# Patient Record
Sex: FEMALE | Race: WHITE | NOT HISPANIC OR LATINO | Employment: FULL TIME | URBAN - METROPOLITAN AREA
[De-identification: names, ages, dates, MRNs, and addresses within clinical notes are randomized per-mention and may not be internally consistent; named-entity substitution may affect disease eponyms.]

---

## 2017-04-07 ENCOUNTER — OFFICE VISIT (OUTPATIENT)
Dept: OBSTETRICS AND GYNECOLOGY | Facility: CLINIC | Age: 52
End: 2017-04-07

## 2017-04-07 VITALS — WEIGHT: 168 LBS | BODY MASS INDEX: 30.91 KG/M2 | HEIGHT: 62 IN

## 2017-04-07 DIAGNOSIS — Z12.4 SCREENING FOR MALIGNANT NEOPLASM OF CERVIX: ICD-10-CM

## 2017-04-07 DIAGNOSIS — Z12.31 SCREENING MAMMOGRAM, ENCOUNTER FOR: ICD-10-CM

## 2017-04-07 DIAGNOSIS — Z13.9 SCREENING: Primary | ICD-10-CM

## 2017-04-07 PROBLEM — F41.9 ANXIETY: Status: ACTIVE | Noted: 2017-04-07

## 2017-04-07 PROBLEM — L90.0 LICHEN SCLEROSUS: Status: ACTIVE | Noted: 2017-04-07

## 2017-04-07 LAB
BILIRUB BLD-MCNC: NEGATIVE MG/DL
CLARITY, POC: CLEAR
COLOR UR: YELLOW
GLUCOSE UR STRIP-MCNC: NEGATIVE MG/DL
KETONES UR QL: NEGATIVE
LEUKOCYTE EST, POC: NEGATIVE
NITRITE UR-MCNC: NEGATIVE MG/ML
PH UR: 6.5 [PH] (ref 5–8)
PROT UR STRIP-MCNC: NEGATIVE MG/DL
RBC # UR STRIP: NEGATIVE /UL
SP GR UR: 1.03 (ref 1–1.03)
UROBILINOGEN UR QL: NORMAL

## 2017-04-07 PROCEDURE — 99396 PREV VISIT EST AGE 40-64: CPT | Performed by: NURSE PRACTITIONER

## 2017-04-07 PROCEDURE — 81002 URINALYSIS NONAUTO W/O SCOPE: CPT | Performed by: NURSE PRACTITIONER

## 2017-04-07 RX ORDER — CLINDAMYCIN PHOSPHATE 10 MG/ML
SOLUTION TOPICAL
COMMUNITY
Start: 2017-04-03 | End: 2017-06-30

## 2017-04-07 NOTE — PROGRESS NOTES
[facility], [facility]  Routine Annual Visit    2017    Patient: Tori Maya          MR#:[id number]      History of Present Illness    52 y.o. female  who presents for annual exam.Last pap negative 2015. Last mammogram - negative. Never had a colonscopy. Hx tubal ligation. No periods in 2 years. Denies vasomotor symptoms. Hx of lichen schlerosis-uses cream prn.    No LMP recorded. Patient is postmenopausal.  Obstetric History:  OB History      Para Term  AB TAB SAB Ectopic Multiple Living    0 0 0 0 0 0 0 0 0 0         Menstrual History:     No LMP recorded. Patient is postmenopausal.       Sexual History:       ________________________________________  There is no problem list on file for this patient.      Past Medical History:   Diagnosis Date   • Anxiety    • Colitis     childhood   • Epilepsy     age 1 none since    • Hypertension        • Lichen sclerosus    • Skin disease     gaanular skin ds over joints        Past Surgical History:   Procedure Laterality Date   • ANKLE SURGERY     • TUBAL ABDOMINAL LIGATION         History   Smoking Status   • Never Smoker   Smokeless Tobacco   • Never Used       Family History   Problem Relation Age of Onset   • Leukemia Other    • Stroke Father    • Crohn's disease Mother        Prior to Admission medications    Medication Sig Start Date End Date Taking? Authorizing Provider   clindamycin (CLEOCIN T) 1 % swab  4/3/17   Historical Provider, MD     ________________________________________    Current contraception: post menopausal status  History of abnormal Pap smear: no  Family history of uterine or ovarian cancer: no  Family History of colon cancer/colon polyps: no  History of abnormal mammogram: no  History of abnormal lipids: no    The following portions of the patient's history were reviewed and updated as appropriate: allergies, current medications, past family history, past medical history, past social history,  "past surgical history and problem list.    Review of Systems   Constitutional: Negative.    HENT: Negative.    Eyes: Negative for visual disturbance.   Respiratory: Negative for cough, shortness of breath and wheezing.    Cardiovascular: Negative for chest pain, palpitations and leg swelling.   Gastrointestinal: Negative for abdominal distention, abdominal pain, blood in stool, constipation, diarrhea, nausea and vomiting.   Endocrine: Negative for cold intolerance and heat intolerance.   Genitourinary: Negative for difficulty urinating, dyspareunia, dysuria, frequency, genital sores, hematuria, menstrual problem, pelvic pain, urgency, vaginal bleeding, vaginal discharge and vaginal pain.   Musculoskeletal: Negative.    Skin: Negative.    Neurological: Negative for dizziness, weakness, light-headedness, numbness and headaches.   Hematological: Negative.    Psychiatric/Behavioral: Negative.    Breasts: denies breast lumps, skin changes, swelling, tenderness, nipple discharge/changes bilaterally        Objective   Physical Exam    Ht 62\" (157.5 cm)  Wt 168 lb (76.2 kg)  BMI 30.73 kg/m2   BP Readings from Last 3 Encounters:   No data found for BP      Wt Readings from Last 3 Encounters:   04/07/17 168 lb (76.2 kg)        BMI: Estimated body mass index is 30.73 kg/(m^2) as calculated from the following:    Height as of this encounter: 62\" (157.5 cm).    Weight as of this encounter: 168 lb (76.2 kg).         General:   alert, appears stated age and cooperative   Heart: regular rate and rhythm, S1, S2 normal, no murmur, click, rub or gallop   Lungs: clear to auscultation bilaterally   Abdomen: soft, non-tender, without masses or organomegaly   Breast: inspection negative, no nipple discharge or bleeding, no masses or nodularity palpable   Vulva: Mild atrophic changes labia minora and introitus consistent with lichen schlerosis    Vagina: normal mucosa   Cervix: no cervical motion tenderness and no lesions   Uterus: " normal size or normal shape and consistency   Adnexa: normal adnexa and no mass, fullness, tenderness     Assessment:    1. Normal annual exam   2. Lichen schlerosis- pt reports using steriod cream only occasionally. Counseled on importance of adherence to this med and proper use.   3. Due for screening mammogram- arranged today    Plan:    []  Rx:   [x]  Mammogram request made  [x]  PAP done  []  Occult fecal blood test ([facility])  []  Labs:   []  GC/Chl/TV  []  DEXA scan   []  Referral for colonoscopy:     Pt has refused BP check the last several years. Highly encouraged monitoring blood pressure and checking today but she again refuses. Reports it has been a while since she has seen Dr. Hudson-recommend she have full physical and labs with him in near future. Also highly recommended a colonoscopy or referral to GI for colon cancer screening. Tori declines today but states she will consider. Also refuses fit kit today.    Ladan Garcia, APRN  4/7/2017 9:29 AM

## 2017-04-12 ENCOUNTER — HOSPITAL ENCOUNTER (OUTPATIENT)
Dept: MAMMOGRAPHY | Facility: HOSPITAL | Age: 52
Discharge: HOME | End: 2017-04-12
Admitting: NURSE PRACTITIONER

## 2017-04-12 DIAGNOSIS — Z12.31 SCREENING MAMMOGRAM, ENCOUNTER FOR: ICD-10-CM

## 2017-04-12 PROCEDURE — 77063 BREAST TOMOSYNTHESIS BI: CPT

## 2017-04-12 PROCEDURE — G0202 SCR MAMMO BI INCL CAD: HCPCS

## 2017-04-13 ENCOUNTER — TELEPHONE (OUTPATIENT)
Dept: OBSTETRICS AND GYNECOLOGY | Facility: CLINIC | Age: 52
End: 2017-04-13

## 2017-04-13 LAB
CYTOLOGIST CVX/VAG CYTO: NORMAL
CYTOLOGY CVX/VAG DOC THIN PREP: NORMAL
DX ICD CODE: NORMAL
HIV 1 & 2 AB SER-IMP: NORMAL
HPV I/H RISK 4 DNA CVX QL PROBE+SIG AMP: NEGATIVE
Lab: NORMAL
OTHER STN SPEC: NORMAL
PATH REPORT.FINAL DX SPEC: NORMAL
STAT OF ADQ CVX/VAG CYTO-IMP: NORMAL

## 2017-04-13 NOTE — TELEPHONE ENCOUNTER
----- Message from NITHIN Oneill sent at 4/13/2017  2:14 PM EDT -----  Please let pt know her pap smear and mammogram were totally normal. Thanks!

## 2017-06-30 ENCOUNTER — OFFICE VISIT (OUTPATIENT)
Dept: OBSTETRICS AND GYNECOLOGY | Facility: CLINIC | Age: 52
End: 2017-06-30

## 2017-06-30 VITALS — HEIGHT: 62 IN | WEIGHT: 168 LBS | BODY MASS INDEX: 30.91 KG/M2

## 2017-06-30 DIAGNOSIS — L90.0 LICHEN SCLEROSUS: Primary | ICD-10-CM

## 2017-06-30 DIAGNOSIS — T14.90XA TRAUMA: ICD-10-CM

## 2017-06-30 DIAGNOSIS — T14.8XXA BLOOD BLISTER: ICD-10-CM

## 2017-06-30 PROCEDURE — 99203 OFFICE O/P NEW LOW 30 MIN: CPT | Performed by: OBSTETRICS & GYNECOLOGY
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
## 2017-11-01 ENCOUNTER — TELEPHONE (OUTPATIENT)
Dept: OBSTETRICS AND GYNECOLOGY | Facility: CLINIC | Age: 52
End: 2017-11-01

## 2017-11-01 NOTE — TELEPHONE ENCOUNTER
Patient called requesting refill for Valerate Cream .2% Patient stated she saw Ladan in April and Ladan advised her to let her know when she needed a refill; pharmacy states since you didn't write her last script that we need to send to them this script with the new prescribing provider. Please advise.

## 2017-11-02 NOTE — TELEPHONE ENCOUNTER
Patient request her valerate cream refilled.  Seen Ladan 4/7/17 and was told she would fill when needed. Pap was negative.

## 2017-11-03 ENCOUNTER — TELEPHONE (OUTPATIENT)
Dept: OBSTETRICS AND GYNECOLOGY | Facility: CLINIC | Age: 52
End: 2017-11-03

## 2017-11-03 RX ORDER — HYDROCORTISONE VALERATE 2 MG/G
OINTMENT TOPICAL DAILY
Qty: 15 G | Refills: 3 | Status: SHIPPED | OUTPATIENT
Start: 2017-11-03 | End: 2018-05-18

## 2017-11-03 NOTE — TELEPHONE ENCOUNTER
PT CALLED AGAIN REGARDING REFILL ON HER VALERATE CREAM AGAIN. STATES YOU SAID IF SHE NEEDED REFILLS YOU WOULD FILL.   PLEASE ADVISE  ANALI

## 2018-05-18 ENCOUNTER — OFFICE VISIT (OUTPATIENT)
Dept: OBSTETRICS AND GYNECOLOGY | Facility: CLINIC | Age: 53
End: 2018-05-18

## 2018-05-18 VITALS — HEIGHT: 62 IN | WEIGHT: 172 LBS | BODY MASS INDEX: 31.65 KG/M2

## 2018-05-18 DIAGNOSIS — L90.0 LICHEN SCLEROSUS: ICD-10-CM

## 2018-05-18 DIAGNOSIS — Z01.419 ENCOUNTER FOR GYNECOLOGICAL EXAMINATION WITHOUT ABNORMAL FINDING: Primary | ICD-10-CM

## 2018-05-18 DIAGNOSIS — Z13.9 SCREENING FOR CONDITION: ICD-10-CM

## 2018-05-18 LAB
BILIRUB BLD-MCNC: NEGATIVE MG/DL
CLARITY, POC: CLEAR
COLOR UR: YELLOW
GLUCOSE UR STRIP-MCNC: NEGATIVE MG/DL
KETONES UR QL: NEGATIVE
LEUKOCYTE EST, POC: NEGATIVE
NITRITE UR-MCNC: NEGATIVE MG/ML
PH UR: 5 [PH] (ref 5–8)
PROT UR STRIP-MCNC: NEGATIVE MG/DL
RBC # UR STRIP: NEGATIVE /UL
SP GR UR: 1.03 (ref 1–1.03)
UROBILINOGEN UR QL: NORMAL

## 2018-05-18 PROCEDURE — 81002 URINALYSIS NONAUTO W/O SCOPE: CPT | Performed by: OBSTETRICS & GYNECOLOGY

## 2018-05-18 PROCEDURE — 99396 PREV VISIT EST AGE 40-64: CPT | Performed by: OBSTETRICS & GYNECOLOGY

## 2018-05-18 PROCEDURE — 99212 OFFICE O/P EST SF 10 MIN: CPT | Performed by: OBSTETRICS & GYNECOLOGY

## 2018-05-18 RX ORDER — FLUTICASONE PROPIONATE 0.05 %
CREAM (GRAM) TOPICAL DAILY
Qty: 60 G | Refills: 1 | Status: SHIPPED | OUTPATIENT
Start: 2018-05-18 | End: 2020-06-11

## 2018-05-18 NOTE — PROGRESS NOTES
GYN Annual Exam     CC- Here for annual exam.     Tori Maya is a 53 y.o. female who presents for annual well woman exam. Pt is a new pt to me. Menopause 2-3 years ago. No vasomotor symptoms. No HRT. Hx of OCP use. Pt has lichen sclerosis and is having an exacerbation. She has run out of her steroid cream for treatment    OB History      Para Term  AB Living    0 0 0 0 0 0    SAB TAB Ectopic Molar Multiple Live Births    0 0 0   0            Current contraception: post menopausal status  History of abnormal Pap smear: yes - remote at age 17  History of abnormal mammogram: no  Family history of uterine, colon or ovarian cancer: no  Family history of breast cancer: no    Health Maintenance   Topic Date Due   • ANNUAL PHYSICAL  1967   • TDAP/TD VACCINES (1 - Tdap) 1983   • ZOSTER VACCINE (1 of 2) 2014   • HEPATITIS C SCREENING  2017   • COLONOSCOPY  2017   • INFLUENZA VACCINE  2018   • PAP SMEAR  10/21/2018   • MAMMOGRAM  2019       Past Medical History:   Diagnosis Date   • Anxiety    • Colitis     childhood   • Epilepsy     age 1 none since    • Hypertension        • Lichen sclerosus    • Lichen sclerosus    • Skin disease     gaanular skin ds over joints        Past Surgical History:   Procedure Laterality Date   • ANKLE SURGERY     • TUBAL ABDOMINAL LIGATION           Current Outpatient Prescriptions:   •  fluticasone (CUTIVATE) 0.05 % cream, Apply  topically Daily., Disp: 60 g, Rfl: 1    Allergies   Allergen Reactions   • Penicillins Other (See Comments)     Childhood rx       Social History   Substance Use Topics   • Smoking status: Never Smoker   • Smokeless tobacco: Never Used   • Alcohol use Yes      Comment: occ       Family History   Problem Relation Age of Onset   • Leukemia Other    • Stroke Father    • Crohn's disease Mother        Review of Systems   Gastrointestinal: Negative for abdominal distention and abdominal pain.  "  Genitourinary: Negative for dyspareunia, menstrual problem, pelvic pain, vaginal bleeding, vaginal discharge and vaginal pain.       Ht 157.5 cm (62\")   Wt 78 kg (172 lb)   BMI 31.46 kg/m²     Physical Exam   Constitutional: She is oriented to person, place, and time. She appears well-developed and well-nourished.   HENT:   Mouth/Throat: Normal dentition. No dental caries.   Cardiovascular: Normal rate and regular rhythm.    Pulmonary/Chest: Effort normal and breath sounds normal. Right breast exhibits no inverted nipple, no mass, no nipple discharge, no skin change and no tenderness. Left breast exhibits no inverted nipple, no mass, no nipple discharge, no skin change and no tenderness.   Abdominal: Soft. She exhibits no distension and no mass. There is no tenderness.   Genitourinary:       There is no rash, tenderness or lesion on the right labia. There is no rash, tenderness or lesion on the left labia. Uterus is not deviated, not enlarged, not fixed and not tender. Cervix exhibits no motion tenderness, no discharge and no friability. Right adnexum displays no mass, no tenderness and no fullness. Left adnexum displays no mass, no tenderness and no fullness. No tenderness or bleeding in the vagina. No vaginal discharge found.   Genitourinary Comments: Lichen sclerosis   Neurological: She is alert and oriented to person, place, and time.   Psychiatric: She has a normal mood and affect. Her behavior is normal. Judgment and thought content normal.   Vitals reviewed.         Assessment/Plan    1) GYN HM: normal pap with neg HR HPV. MMG ordered. Never had a colonoscopy- discuussed proceeding with screening test.   SBE demonstrated and encouraged.  2) Lichen sclerosis: Rx Fluticasone cream to use 1-2 wks qhs.  3) Bone health - Weight bearing exercise, dietary calcium recommendations and vitamin D reviewed.   4) Diet and Exercise discussed  5) Smoking Status: nonsmoker  6) Social: Pt with very high anxiety about " being at physician. Refuses Blood pressure.  7) Follow up prn and 1 year       Diagnoses and all orders for this visit:    Encounter for gynecological examination without abnormal finding  -     Mammo Screening Bilateral With CAD; Future    Screening for condition  -     POC Urinalysis Dipstick    Lichen sclerosus    Other orders  -     fluticasone (CUTIVATE) 0.05 % cream; Apply  topically Daily.        Marli Briones DO  5/18/2018  9:46 AM

## 2018-05-29 ENCOUNTER — HOSPITAL ENCOUNTER (OUTPATIENT)
Dept: MAMMOGRAPHY | Facility: HOSPITAL | Age: 53
Discharge: HOME | End: 2018-05-29
Attending: OBSTETRICS & GYNECOLOGY | Admitting: OBSTETRICS & GYNECOLOGY

## 2018-05-29 DIAGNOSIS — Z01.419 ENCOUNTER FOR GYNECOLOGICAL EXAMINATION WITHOUT ABNORMAL FINDING: ICD-10-CM

## 2018-05-29 PROCEDURE — 77067 SCR MAMMO BI INCL CAD: CPT

## 2019-05-21 ENCOUNTER — OFFICE VISIT (OUTPATIENT)
Dept: OBSTETRICS AND GYNECOLOGY | Facility: CLINIC | Age: 54
End: 2019-05-21

## 2019-05-21 VITALS — WEIGHT: 159 LBS | BODY MASS INDEX: 29.26 KG/M2 | HEIGHT: 62 IN

## 2019-05-21 DIAGNOSIS — K64.9 ACUTE HEMORRHOID: ICD-10-CM

## 2019-05-21 DIAGNOSIS — Z01.419 WELL FEMALE EXAM WITH ROUTINE GYNECOLOGICAL EXAM: Primary | ICD-10-CM

## 2019-05-21 DIAGNOSIS — Z11.51 ENCOUNTER FOR SCREENING FOR HUMAN PAPILLOMAVIRUS (HPV): ICD-10-CM

## 2019-05-21 DIAGNOSIS — L90.0 LICHEN SCLEROSUS: ICD-10-CM

## 2019-05-21 DIAGNOSIS — F41.9 ANXIETY: ICD-10-CM

## 2019-05-21 LAB
BILIRUB BLD-MCNC: NEGATIVE MG/DL
CLARITY, POC: CLEAR
COLOR UR: YELLOW
GLUCOSE UR STRIP-MCNC: NEGATIVE MG/DL
KETONES UR QL: NEGATIVE
LEUKOCYTE EST, POC: NEGATIVE
NITRITE UR-MCNC: NEGATIVE MG/ML
PH UR: 6.5 [PH] (ref 5–8)
PROT UR STRIP-MCNC: NEGATIVE MG/DL
RBC # UR STRIP: NEGATIVE /UL
SP GR UR: 1.02 (ref 1–1.03)
UROBILINOGEN UR QL: NORMAL

## 2019-05-21 PROCEDURE — 99396 PREV VISIT EST AGE 40-64: CPT | Performed by: NURSE PRACTITIONER

## 2019-05-21 PROCEDURE — 81002 URINALYSIS NONAUTO W/O SCOPE: CPT | Performed by: NURSE PRACTITIONER

## 2019-05-23 ENCOUNTER — TELEPHONE (OUTPATIENT)
Dept: OBSTETRICS AND GYNECOLOGY | Facility: CLINIC | Age: 54
End: 2019-05-23

## 2019-05-23 LAB
CYTOLOGIST CVX/VAG CYTO: NORMAL
CYTOLOGY CVX/VAG DOC CYTO: NORMAL
CYTOLOGY CVX/VAG DOC THIN PREP: NORMAL
DX ICD CODE: NORMAL
HIV 1 & 2 AB SER-IMP: NORMAL
HPV I/H RISK 1 DNA CVX QL PROBE+SIG AMP: NEGATIVE
OTHER STN SPEC: NORMAL
STAT OF ADQ CVX/VAG CYTO-IMP: NORMAL

## 2019-05-23 RX ORDER — CLOBETASOL PROPIONATE 0.5 MG/G
OINTMENT TOPICAL 2 TIMES DAILY
Qty: 60 G | Refills: 1 | Status: SHIPPED | OUTPATIENT
Start: 2019-05-23 | End: 2020-06-11

## 2019-05-23 NOTE — TELEPHONE ENCOUNTER
Patient calling to see if you had found out about the replacement for her medication. She would like to talk to you.

## 2019-05-23 NOTE — TELEPHONE ENCOUNTER
Please let her know I finally got the clobetasol to go without the computer system questioning it.  If it is very expensive, we can send in an alternative.

## 2019-05-30 ENCOUNTER — OFFICE VISIT (OUTPATIENT)
Dept: OBSTETRICS AND GYNECOLOGY | Facility: CLINIC | Age: 54
End: 2019-05-30

## 2019-05-30 DIAGNOSIS — D69.2 PURPURA (HCC): Primary | ICD-10-CM

## 2019-05-30 PROCEDURE — 99212 OFFICE O/P EST SF 10 MIN: CPT | Performed by: NURSE PRACTITIONER

## 2019-06-06 PROBLEM — D69.2 PURPURA (HCC): Status: ACTIVE | Noted: 2019-06-06

## 2019-06-06 NOTE — PROGRESS NOTES
Subjective     Chief Complaint   Patient presents with   • Vaginal Pain       Tori Maya is a 54 y.o.  whose LMP is No LMP recorded. Patient is postmenopausal.. She presents with complaints of a vaginal cut. She states that after her AE she decided to do a vulvar self exam. During the exam she noticed the cut. She is worried that she could have vaginal cancer and is uncertain of what she needs to do. She denies pain. She does report having some itching d/t lichen sclerosis. She does scratch at times. She also has clobetasol to apply to the affected area when needed. She states she has been obsessively looking at the area. States she has very high anxiety in regards. Her annual exam was just completed last week.     HPI    HPI    The following portions of the patient's history were reviewed and updated as appropriate:vital signs, allergies, current medications, past medical history, past social history, past surgical history and problem list      Review of Systems     Review of Systems   Constitutional: Negative.    Gastrointestinal: Negative.    Genitourinary: Negative.         Vaginal cut     Psychiatric/Behavioral: Negative.        Objective      There were no vitals taken for this visit.    Physical Exam    Physical Exam   Constitutional: She is oriented to person, place, and time. She appears well-developed and well-nourished.   Abdominal: Soft. Bowel sounds are normal. Hernia confirmed negative in the right inguinal area and confirmed negative in the left inguinal area.   Genitourinary: Rectum normal.       Pelvic exam was performed with patient supine. There is no rash, tenderness, lesion or injury on the right labia. There is no rash, tenderness, lesion or injury on the left labia. Uterus is not deviated, not enlarged, not fixed and not tender. Cervix exhibits no motion tenderness, no discharge and no friability. Right adnexum displays no mass, no tenderness and no fullness. Left adnexum displays no  "mass, no tenderness and no fullness. No erythema, tenderness or bleeding in the vagina. No foreign body in the vagina. No signs of injury around the vagina. No vaginal discharge found.   Lymphadenopathy:        Right: No inguinal adenopathy present.        Left: No inguinal adenopathy present.   Neurological: She is alert and oriented to person, place, and time.   Skin: Skin is warm and dry.   Psychiatric: She has a normal mood and affect. Her behavior is normal.   Vitals reviewed.      Lab Review   Labs: No data reviewed     Imaging   No data reviewed    Assessment  There are no diagnoses linked to this encounter.    Additional Assessment:   1) Vulvar lesion    Plan     1. Vulvar lesion- Appears to be purpura of the vulva. Pt reassured. Advised pt that if area enlarges or becomes painful to notify the clinic. Instructed pt to \"leave the area alone.\" Enc use of clobetasol cream if itching occurs.    2. Pap:  5/19 NIL/HPV neg.     3. Smoking status: Non smoker.     4.  Annual Exam scheduled for: 5/20    RTO PRN.     Roma Sosa, NITHIN  5/30/19  "

## 2019-06-10 ENCOUNTER — HOSPITAL ENCOUNTER (OUTPATIENT)
Dept: MAMMOGRAPHY | Facility: HOSPITAL | Age: 54
Discharge: HOME | End: 2019-06-10
Admitting: NURSE PRACTITIONER

## 2019-06-10 DIAGNOSIS — Z01.419 WELL FEMALE EXAM WITH ROUTINE GYNECOLOGICAL EXAM: ICD-10-CM

## 2019-06-10 PROCEDURE — 77063 BREAST TOMOSYNTHESIS BI: CPT

## 2019-06-10 PROCEDURE — 77067 SCR MAMMO BI INCL CAD: CPT

## 2019-09-18 ENCOUNTER — TELEPHONE (OUTPATIENT)
Dept: OBSTETRICS AND GYNECOLOGY | Facility: CLINIC | Age: 54
End: 2019-09-18

## 2019-09-18 NOTE — TELEPHONE ENCOUNTER
Pt wants to know if there is an cheaper alternative to clobetasol because it cost her $75 from the pharmacy?

## 2019-09-19 NOTE — TELEPHONE ENCOUNTER
There really is not an alternative or anything cheaper that will give you the same benefit. Has she looked at [facility]?

## 2019-12-09 RX ORDER — HYDROCORTISONE ACETATE 25 MG/1
25 SUPPOSITORY RECTAL
Status: CANCELLED | OUTPATIENT
Start: 2019-12-09

## 2020-06-11 ENCOUNTER — OFFICE VISIT (OUTPATIENT)
Dept: OBSTETRICS AND GYNECOLOGY | Facility: CLINIC | Age: 55
End: 2020-06-11

## 2020-06-11 VITALS — BODY MASS INDEX: 29.63 KG/M2 | WEIGHT: 161 LBS | HEIGHT: 62 IN

## 2020-06-11 DIAGNOSIS — Z78.0 POSTMENOPAUSAL: ICD-10-CM

## 2020-06-11 DIAGNOSIS — N95.2 VAGINAL ATROPHY: ICD-10-CM

## 2020-06-11 DIAGNOSIS — Z01.419 PAP SMEAR, LOW-RISK: ICD-10-CM

## 2020-06-11 DIAGNOSIS — Z01.419 WELL WOMAN EXAM WITH ROUTINE GYNECOLOGICAL EXAM: Primary | ICD-10-CM

## 2020-06-11 DIAGNOSIS — L90.0 LICHEN SCLEROSUS: ICD-10-CM

## 2020-06-11 DIAGNOSIS — Z11.51 SPECIAL SCREENING EXAMINATION FOR HUMAN PAPILLOMAVIRUS (HPV): ICD-10-CM

## 2020-06-11 DIAGNOSIS — Z13.9 SCREENING FOR CONDITION: ICD-10-CM

## 2020-06-11 PROCEDURE — 99396 PREV VISIT EST AGE 40-64: CPT | Performed by: NURSE PRACTITIONER

## 2020-06-11 RX ORDER — HYDROCORTISONE ACETATE 25 MG/1
25 SUPPOSITORY RECTAL 2 TIMES DAILY
Qty: 20 EACH | Refills: 3 | Status: SHIPPED | OUTPATIENT
Start: 2020-06-11 | End: 2022-07-20

## 2020-06-22 PROBLEM — Z78.0 POSTMENOPAUSAL: Status: ACTIVE | Noted: 2020-06-22

## 2020-06-22 PROBLEM — Z01.419 WELL WOMAN EXAM WITH ROUTINE GYNECOLOGICAL EXAM: Status: ACTIVE | Noted: 2020-06-22

## 2020-06-22 PROBLEM — N95.2 VAGINAL ATROPHY: Status: ACTIVE | Noted: 2020-06-22

## 2020-06-22 NOTE — PROGRESS NOTES
GYN Annual Exam     Chief Complaint   Patient presents with   • Gynecologic Exam       Tori Maya is a 55 y.o. female who presents for annual well woman exam.She is an established patient. Her periods are absent since 2016. She denies menopausal symptoms.  No intermenstrual bleeding, spotting, or discharge. Mammogram neg, . She has a history of lichen sclerosis, she uses Clobetasol as needed, reports it is well controlled. She states she still has clobetasol at home if needed. She has not used the medication in a while. She has not had a colonoscopy and states she knows she needs one but has such high anxiety that she does not know if she can go through with it. She has considered [facility]. She reports she is very anxious about today's visit.  She does not do her SBE regularly. She is sexually active with her .     OB History        0    Para   0    Term   0       0    AB   0    Living   0       SAB   0    TAB   0    Ectopic   0    Molar        Multiple   0    Live Births                    Mammogram:   Dexa scan: Has not had  Colonoscopy: Has not had  Last Pap :   History of abnormal Pap smear: no  Family history of uterine, colon or ovarian cancer: no  Family history of breast cancer: no  History of abnormal mammogram: no        Past Medical History:   Diagnosis Date   • Anxiety    • Colitis     childhood   • Epilepsy (CMS/HCC)     age 1 none since    • Hypertension        • Lichen sclerosus    • Lichen sclerosus    • Skin disease     gaanular skin ds over joints        Past Surgical History:   Procedure Laterality Date   • ANKLE SURGERY     • TUBAL ABDOMINAL LIGATION           Current Outpatient Medications:   •  hydrocortisone (ANUSOL-HC) 25 MG suppository, Insert 1 suppository into the rectum 2 (Two) Times a Day., Disp: 20 each, Rfl: 3    Allergies   Allergen Reactions   • Penicillins Other (See Comments)     Childhood rx       Social History     Tobacco Use   •  "Smoking status: Never Smoker   • Smokeless tobacco: Never Used   Substance Use Topics   • Alcohol use: Yes     Comment: occ   • Drug use: No       Family History   Problem Relation Age of Onset   • Leukemia Other    • Stroke Father    • Crohn's disease Mother    • Breast cancer Neg Hx    • Ovarian cancer Neg Hx    • Uterine cancer Neg Hx    • Colon cancer Neg Hx        Review of Systems   Constitutional: Negative.    Respiratory: Negative.    Cardiovascular: Negative.    Gastrointestinal: Negative.    Endocrine: Negative.    Genitourinary: Negative.    Musculoskeletal: Negative.    Skin: Negative.    Neurological: Negative.    Psychiatric/Behavioral: Negative.        Ht 157.5 cm (62\")   Wt 73 kg (161 lb)   BMI 29.45 kg/m²     Physical Exam   Constitutional: She is oriented to person, place, and time. She appears well-developed and well-nourished.   Neck: Normal range of motion. Neck supple. No thyromegaly present.   Cardiovascular: Normal rate and regular rhythm.   Pulmonary/Chest: Effort normal and breath sounds normal. Right breast exhibits no inverted nipple, no mass, no nipple discharge, no skin change and no tenderness. Left breast exhibits no inverted nipple, no mass, no nipple discharge, no skin change and no tenderness.   Abdominal: Soft. Bowel sounds are normal.   Genitourinary: Rectum normal. Pelvic exam was performed with patient supine. There is no rash, tenderness, lesion or injury on the right labia. There is no rash, tenderness, lesion or injury on the left labia. Uterus is not deviated, not enlarged, not fixed and not tender. Cervix exhibits no motion tenderness, no discharge and no friability. Right adnexum displays no mass, no tenderness and no fullness. Left adnexum displays no mass, no tenderness and no fullness. No erythema, tenderness or bleeding in the vagina. No foreign body in the vagina. No signs of injury around the vagina. No vaginal discharge found.   Genitourinary Comments: Vaginal " atrophy noted.    Musculoskeletal: Normal range of motion. She exhibits no edema.   Neurological: She is alert and oriented to person, place, and time.   Skin: Skin is warm and dry.   Psychiatric: She has a normal mood and affect. Her behavior is normal.   Vitals reviewed.         Assessment     1) GYN annual well woman exam.   2) Postmenopausal   3) Vaginal atrophy   4) Lichen sclerosis      Plan     1) Breast Health - Clinical breast exam & mammogram yearly, Self breast awareness. Schedule screening mammogram.   2) Pap - Collected today at pt's request.  Pap/HPV neg 5/19.   3) STD- Declines   4 Smoking status- non smoker  5) Colon health - screening colonoscopy recommended if not up to date. Disc [facility], pt declines at this time. Enc screening.   6) Patient's Body mass index is 29.45 kg/m². BMI is above normal parameters. Recommendations include: exercise counseling and nutrition counseling.  7) Bone health - Weight bearing exercise, dietary calcium recommendations and vitamin D  reviewed.   8) Vaginal atrophy- Samples of Imvexy 4 mg provided for trial. Pt to call office if she desires a rx.   9) Lichen sclerosis- Has Clobetasol cream at home if needed.   10) Follow up prn and one year    Roma Sosa, NITHIN  6/11/20

## 2020-08-04 ENCOUNTER — HOSPITAL ENCOUNTER (OUTPATIENT)
Dept: MAMMOGRAPHY | Facility: HOSPITAL | Age: 55
Discharge: HOME | End: 2020-08-04
Admitting: NURSE PRACTITIONER

## 2020-08-04 DIAGNOSIS — Z01.419 WELL WOMAN EXAM WITH ROUTINE GYNECOLOGICAL EXAM: ICD-10-CM

## 2020-08-04 PROCEDURE — 77067 SCR MAMMO BI INCL CAD: CPT

## 2020-08-04 PROCEDURE — 77063 BREAST TOMOSYNTHESIS BI: CPT

## 2021-03-23 RX ORDER — FLUTICASONE PROPIONATE 0.05 %
CREAM (GRAM) TOPICAL DAILY
Qty: 60 EACH | Refills: 0 | Status: SHIPPED | OUTPATIENT
Start: 2021-03-23 | End: 2022-02-15

## 2021-10-19 ENCOUNTER — OFFICE VISIT (OUTPATIENT)
Dept: OBSTETRICS AND GYNECOLOGY | Facility: CLINIC | Age: 56
End: 2021-10-19

## 2021-10-19 VITALS — WEIGHT: 170.4 LBS | BODY MASS INDEX: 31.36 KG/M2 | HEIGHT: 62 IN

## 2021-10-19 DIAGNOSIS — Z11.51 SPECIAL SCREENING EXAMINATION FOR HUMAN PAPILLOMAVIRUS (HPV): ICD-10-CM

## 2021-10-19 DIAGNOSIS — N95.2 VAGINAL ATROPHY: ICD-10-CM

## 2021-10-19 DIAGNOSIS — Z78.0 POSTMENOPAUSAL: ICD-10-CM

## 2021-10-19 DIAGNOSIS — N90.89 VULVAR LESION: ICD-10-CM

## 2021-10-19 DIAGNOSIS — Z01.419 PAP TEST, AS PART OF ROUTINE GYNECOLOGICAL EXAMINATION: ICD-10-CM

## 2021-10-19 DIAGNOSIS — Z01.419 PAP SMEAR, LOW-RISK: ICD-10-CM

## 2021-10-19 DIAGNOSIS — Z01.419 WELL WOMAN EXAM WITH ROUTINE GYNECOLOGICAL EXAM: Primary | ICD-10-CM

## 2021-10-19 LAB
BILIRUB BLD-MCNC: NEGATIVE MG/DL
CLARITY, POC: CLEAR
COLOR UR: YELLOW
GLUCOSE UR STRIP-MCNC: NEGATIVE MG/DL
KETONES UR QL: NEGATIVE
LEUKOCYTE EST, POC: NEGATIVE
NITRITE UR-MCNC: NEGATIVE MG/ML
PH UR: 6 [PH] (ref 5–8)
PROT UR STRIP-MCNC: NEGATIVE MG/DL
RBC # UR STRIP: NEGATIVE /UL
SP GR UR: 1.02 (ref 1–1.03)
UROBILINOGEN UR QL: NORMAL

## 2021-10-19 PROCEDURE — 99396 PREV VISIT EST AGE 40-64: CPT | Performed by: NURSE PRACTITIONER

## 2021-10-19 PROCEDURE — 81002 URINALYSIS NONAUTO W/O SCOPE: CPT | Performed by: NURSE PRACTITIONER

## 2021-10-19 NOTE — PROGRESS NOTES
GYN Annual Exam     Chief Complaint   Patient presents with   • Gynecologic Exam       Tori Maya is a 56 y.o. female who presents for annual well woman exam.She is an established patient. Her periods are absent since 2016. She denies menopausal symptoms.  No intermenstrual bleeding, spotting, or discharge. Mammogram neg, .  She has a history of lichen sclerosis, she uses Clobetasol as needed, reports it is well controlled. She states she still has clobetasol at home if needed. She has not used the medication in a while.     She has not had a colonoscopy and states she knows she needs one but has such high anxiety that she does not know if she can go through with it. She has considered [place] [facility].     She does her SBE regularly. She is sexually active with her . She does c/o decreased libido    She is extremely nervous regarding her exam.     OB History        0    Para   0    Term   0       0    AB   0    Living   0       SAB   0    IAB   0    Ectopic   0    Molar        Multiple   0    Live Births                    Mammogram:  Dexa scan: Has not had  Colonoscopy: Has not had  Last Pap :  NIL/HPV neg   History of abnormal Pap smear: no  Family history of uterine, colon or ovarian cancer: no  Family history of breast cancer: no  History of abnormal mammogram: no        Past Medical History:   Diagnosis Date   • Anxiety    • Colitis     childhood   • Epilepsy (HCC)     age 1 none since    • Hypertension        • Lichen sclerosus    • Lichen sclerosus    • Skin disease     gaanular skin ds over joints        Past Surgical History:   Procedure Laterality Date   • ANKLE SURGERY     • TUBAL ABDOMINAL LIGATION           Current Outpatient Medications:   •  fluticasone (CUTIVATE) 0.05 % cream, APPLY TOPICALLY DAILY, Disp: 60 each, Rfl: 0  •  hydrocortisone (ANUSOL-HC) 25 MG suppository, Insert 1 suppository into the rectum 2 (Two) Times a Day., Disp: 20 each, Rfl:  "3    Allergies   Allergen Reactions   • Penicillins Other (See Comments)     Childhood rx       Social History     Tobacco Use   • Smoking status: Never Smoker   • Smokeless tobacco: Never Used   Substance Use Topics   • Alcohol use: Yes     Comment: occ   • Drug use: No       Family History   Problem Relation Age of Onset   • Leukemia Other    • Stroke Father    • Crohn's disease Mother    • Breast cancer Neg Hx    • Ovarian cancer Neg Hx    • Uterine cancer Neg Hx    • Colon cancer Neg Hx        Review of Systems   Constitutional: Negative.    Respiratory: Negative.    Cardiovascular: Negative.    Gastrointestinal: Negative.    Endocrine: Negative.    Genitourinary: Negative.         Decreased sex drive    Musculoskeletal: Negative.    Skin: Negative.    Neurological: Negative.    Psychiatric/Behavioral: Negative.        Ht 157.5 cm (62\")   Wt 77.3 kg (170 lb 6.4 oz)   Breastfeeding No   BMI 31.17 kg/m²     Physical Exam   Constitutional: She is oriented to person, place, and time. She appears well-developed.   Neck: No thyromegaly present.   Cardiovascular: Normal rate, regular rhythm and normal heart sounds.   Pulmonary/Chest: Effort normal and breath sounds normal. Right breast exhibits no inverted nipple, no mass, no nipple discharge, no skin change and no tenderness. Left breast exhibits no inverted nipple, no mass, no nipple discharge, no skin change and no tenderness.   Abdominal: Soft. Bowel sounds are normal.   Genitourinary:    Rectum normal.            Pelvic exam was performed with patient supine.   There is lesion on the right labia. There is no rash, tenderness or injury on the right labia. There is no rash, tenderness, lesion or injury on the left labia. Uterus is not deviated, not enlarged, not fixed and not tender. Cervix exhibits no motion tenderness, no discharge and no friability. Right adnexum displays no mass, no tenderness and no fullness. Left adnexum displays no mass, no tenderness and " "no fullness.    No vaginal discharge, erythema, tenderness or bleeding.   No erythema, tenderness or bleeding in the vagina.    No foreign body in the vagina.      No signs of injury in the vagina.      Genitourinary Comments: Vaginal atrophy noted.      Musculoskeletal: Normal range of motion.   Neurological: She is alert and oriented to person, place, and time.   Skin: Skin is warm and dry.   Psychiatric: Her behavior is normal. Mood normal.   Vitals reviewed.         Assessment     1) GYN annual well woman exam.   2) Postmenopausal   3) Vaginal atrophy   4) Lichen sclerosis   5) Vulvar lesion   6) Decreased libido     Plan     1) Breast Health - Clinical breast exam & mammogram yearly, Self breast awareness. Schedule screening mammogram.   2) Pap - Collected today at pt's request.  Pap/HPV neg 6/20.   3) STD- Declines   4 Smoking status- non smoker  5) Colon health - screening colonoscopy recommended if not up to date. Disc ColoGuard, enc pt to consider. Enc screening.   6) Patient's Body mass index is 31.17 kg/m². BMI is above normal parameters. Recommendations include: exercise counseling and nutrition counseling.  7) Bone health - Weight bearing exercise, dietary calcium recommendations and vitamin D  reviewed.   8) Vaginal atrophy- Denies c/o. Atrophy noted on exam.   9) Lichen sclerosis- Has Clobetasol cream at home if needed.   10) Vulvar lesion- Pt reports she \"scratched\" the area. She has looked at the area with a mirror. Rec Clobetasol BID and aquaphor for symptoms. If no improvement, needs vulvar bx in 2 weeks.   11) Decreased libido- Disc with patient. She declines ref to sex therapist. Disc medication option, pt declines.     RTO 2 weeks    Roma Sosa, APRN  6/11/20  "

## 2021-10-21 LAB
CYTOLOGIST CVX/VAG CYTO: NORMAL
CYTOLOGY CVX/VAG DOC CYTO: NORMAL
CYTOLOGY CVX/VAG DOC THIN PREP: NORMAL
DX ICD CODE: NORMAL
HIV 1 & 2 AB SER-IMP: NORMAL
HPV I/H RISK 4 DNA CVX QL PROBE+SIG AMP: NEGATIVE
OTHER STN SPEC: NORMAL
STAT OF ADQ CVX/VAG CYTO-IMP: NORMAL

## 2021-10-21 RX ORDER — TRIAMCINOLONE ACETONIDE 5 MG/G
1 OINTMENT TOPICAL 2 TIMES DAILY
Qty: 15 G | Refills: 0 | Status: SHIPPED | OUTPATIENT
Start: 2021-10-21 | End: 2022-10-26 | Stop reason: SDUPTHER

## 2021-12-02 ENCOUNTER — TRANSCRIBE ORDERS (OUTPATIENT)
Dept: ADMINISTRATIVE | Facility: HOSPITAL | Age: 56
End: 2021-12-02

## 2021-12-02 DIAGNOSIS — Z12.31 SCREENING MAMMOGRAM FOR BREAST CANCER: Primary | ICD-10-CM

## 2021-12-21 ENCOUNTER — HOSPITAL ENCOUNTER (OUTPATIENT)
Dept: MAMMOGRAPHY | Facility: HOSPITAL | Age: 56
Discharge: HOME | End: 2021-12-21
Admitting: NURSE PRACTITIONER

## 2021-12-21 DIAGNOSIS — Z12.31 SCREENING MAMMOGRAM FOR BREAST CANCER: ICD-10-CM

## 2021-12-21 PROCEDURE — 77063 BREAST TOMOSYNTHESIS BI: CPT

## 2021-12-21 PROCEDURE — 77067 SCR MAMMO BI INCL CAD: CPT

## 2022-02-15 ENCOUNTER — OFFICE VISIT (OUTPATIENT)
Dept: OBSTETRICS AND GYNECOLOGY | Facility: CLINIC | Age: 57
End: 2022-02-15

## 2022-02-15 VITALS — BODY MASS INDEX: 31.47 KG/M2 | HEIGHT: 62 IN | WEIGHT: 171 LBS

## 2022-02-15 DIAGNOSIS — N90.5 VULVAR ATROPHY: Primary | ICD-10-CM

## 2022-02-15 DIAGNOSIS — Z13.89 SCREENING FOR GENITOURINARY CONDITION: ICD-10-CM

## 2022-02-15 LAB
BILIRUB BLD-MCNC: NEGATIVE MG/DL
CLARITY, POC: CLEAR
COLOR UR: YELLOW
GLUCOSE UR STRIP-MCNC: NEGATIVE MG/DL
KETONES UR QL: NEGATIVE
LEUKOCYTE EST, POC: NEGATIVE
NITRITE UR-MCNC: NEGATIVE MG/ML
PH UR: 5 [PH] (ref 5–8)
PROT UR STRIP-MCNC: NEGATIVE MG/DL
RBC # UR STRIP: NEGATIVE /UL
SP GR UR: 1 (ref 1–1.03)
UROBILINOGEN UR QL: NORMAL

## 2022-02-15 PROCEDURE — 99213 OFFICE O/P EST LOW 20 MIN: CPT | Performed by: NURSE PRACTITIONER

## 2022-02-15 PROCEDURE — 81002 URINALYSIS NONAUTO W/O SCOPE: CPT | Performed by: NURSE PRACTITIONER

## 2022-02-15 NOTE — PROGRESS NOTES
"Subjective     Chief Complaint   Patient presents with   • Follow-up     lichen sclerosis       Tori Maya is a 57 y.o.  whose LMP is No LMP recorded. Patient is postmenopausal.. She presents with c/o a red spot in on the (L) labia that is worrisome to her. She has known lichen sclerosis. She has not been using her steroid cream because her symptoms are well controlled right now. She noticed the spot while doing a vulvar self exam.     HPI    HPI    The following portions of the patient's history were reviewed and updated as appropriate:vital signs, allergies, current medications, past medical history, past social history, past surgical history and problem list      Review of Systems     Review of Systems   Constitutional: Negative.    Gastrointestinal: Negative.    Genitourinary:        Red spot on the inside of (L) labia     Musculoskeletal: Negative.    Skin: Negative.    Psychiatric/Behavioral: Negative.        Objective      Ht 157.5 cm (62.01\")   Wt 77.6 kg (171 lb)   BMI 31.27 kg/m²     Physical Exam    Physical Exam  Vitals and nursing note reviewed.   Genitourinary:     Labia:         Right: Tenderness present. No rash, lesion or injury.         Left: Tenderness and lesion present. No rash or injury.           Comments: Atrophy noted. LIchen sclerosis present but appears improved from last exam.   Musculoskeletal:         General: Normal range of motion.   Skin:     General: Skin is warm and dry.   Neurological:      General: No focal deficit present.      Mental Status: She is oriented to person, place, and time.   Psychiatric:         Mood and Affect: Mood normal.         Lab Review   Labs: Urinalysis - with micro     Imaging   No data reviewed    Assessment  Diagnoses and all orders for this visit:    1. Screening for genitourinary condition (Primary)  -     POC Urinalysis Dipstick    Other orders  -     conjugated estrogens (PREMARIN) 0.625 MG/GM vaginal cream; Insert 1 applicator in to the " vagina twice weekly  Dispense: 30 g; Refill: 6        Additional Assessment:   1. Atrophy   2. Lichen sclerosis      Plan     1. Atrophy-  [place] in to view area as well. Agrees this is most likely atrophy. Rec vaginal estrogen cream. Pt is concerned regarding HRT. Disc topical vs systemic HRT. She agrees to trial the estrogen vaginal cream. ERX sent.   2. Lichen sclerosis- Disc difference between lichen sclerosis and atrophy. Continue Vulvar self exams.     RTO PRN     Roma Sosa, APRN  2/15/2022

## 2022-07-20 RX ORDER — HYDROCORTISONE ACETATE 25 MG
SUPPOSITORY, RECTAL RECTAL
Qty: 20 SUPPOSITORY | Refills: 0 | Status: SHIPPED | OUTPATIENT
Start: 2022-07-20

## 2022-10-26 ENCOUNTER — OFFICE VISIT (OUTPATIENT)
Dept: OBSTETRICS AND GYNECOLOGY | Facility: CLINIC | Age: 57
End: 2022-10-26

## 2022-10-26 VITALS — BODY MASS INDEX: 31.1 KG/M2 | WEIGHT: 169 LBS | HEIGHT: 62 IN

## 2022-10-26 DIAGNOSIS — Z01.419 ROUTINE GYNECOLOGICAL EXAMINATION: Primary | ICD-10-CM

## 2022-10-26 DIAGNOSIS — Z01.419 PAP SMEAR, LOW-RISK: ICD-10-CM

## 2022-10-26 DIAGNOSIS — N90.5 VULVAR ATROPHY: ICD-10-CM

## 2022-10-26 DIAGNOSIS — Z11.51 SPECIAL SCREENING EXAMINATION FOR HUMAN PAPILLOMAVIRUS (HPV): ICD-10-CM

## 2022-10-26 PROCEDURE — 99396 PREV VISIT EST AGE 40-64: CPT | Performed by: NURSE PRACTITIONER

## 2022-10-26 RX ORDER — TRIAMCINOLONE ACETONIDE 5 MG/G
1 OINTMENT TOPICAL 2 TIMES DAILY
Qty: 15 G | Refills: 0 | Status: SHIPPED | OUTPATIENT
Start: 2022-10-26

## 2022-11-09 PROBLEM — Z01.419 ROUTINE GYNECOLOGICAL EXAMINATION: Status: ACTIVE | Noted: 2022-11-09

## 2022-11-09 NOTE — PROGRESS NOTES
"GYN Annual Exam     Chief Complaint   Patient presents with   • Gynecologic Exam       Tori Maya is a 57 y.o. female who presents for annual well woman exam.She is an established patient. Her periods are absent since 2016. She denies menopausal symptoms.  No intermenstrual bleeding, spotting, or discharge. Mammogram neg, .    She has a history of lichen sclerosis, she uses Clobetasol as needed, reports it is well controlled. She states she still has clobetasol at home if needed. She has not used the medication in a while.  She has a RX for vaginal estrogen but does not use, she states, \"that medicine scares me.\"     She has not had a colonoscopy and states she knows she needs one but has such high anxiety that she does not know if she can go through with it. She has considered [place] [facility].     She does her SBE regularly. She is sexually active with her .     She is extremely nervous regarding her exam.     OB History        0    Para   0    Term   0       0    AB   0    Living   0       SAB   0    IAB   0    Ectopic   0    Molar        Multiple   0    Live Births                    Mammogram:  Dexa scan: Has not had  Colonoscopy: Has not had  Last Pap :  NIL/HPV neg   History of abnormal Pap smear: no  Family history of uterine, colon or ovarian cancer: no  Family history of breast cancer: no  History of abnormal mammogram: no        Past Medical History:   Diagnosis Date   • Anxiety    • Colitis     childhood   • Epilepsy (HCC)     age 1 none since    • Hypertension        • Lichen sclerosus    • Lichen sclerosus    • Skin disease     gaanular skin ds over joints        Past Surgical History:   Procedure Laterality Date   • ANKLE SURGERY     • TUBAL ABDOMINAL LIGATION           Current Outpatient Medications:   •  triamcinolone (KENALOG) 0.5 % ointment, Apply 1 application topically to the appropriate area as directed 2 (Two) Times a Day., Disp: 15 g, Rfl: 0  •  " "Anucort-HC 25 MG suppository, INSERT 1 SUPPOSITORY INTO THE RECTUM TWO TIMES A DAY, Disp: 20 suppository, Rfl: 0  •  Estrogens Conjugated (PREMARIN) 0.625 MG/GM vaginal cream, Insert  into the vagina 2 (Two) Times a Week. 1g per vagina, twice a week, Disp: 45 g, Rfl: 6    Allergies   Allergen Reactions   • Penicillins Other (See Comments)     Childhood rx       Social History     Tobacco Use   • Smoking status: Never   • Smokeless tobacco: Never   Substance Use Topics   • Alcohol use: Yes     Comment: occ   • Drug use: No       Family History   Problem Relation Age of Onset   • Leukemia Other    • Stroke Father    • Crohn's disease Mother    • Breast cancer Neg Hx    • Ovarian cancer Neg Hx    • Uterine cancer Neg Hx    • Colon cancer Neg Hx        Review of Systems   Constitutional: Negative.    Respiratory: Negative.    Cardiovascular: Negative.    Gastrointestinal: Negative.    Endocrine: Negative.    Genitourinary: Negative.    Musculoskeletal: Negative.    Skin: Negative.    Neurological: Negative.    Psychiatric/Behavioral: Negative.        Ht 157.5 cm (62\")   Wt 76.7 kg (169 lb)   BMI 30.91 kg/m²     Physical Exam  Vitals reviewed.   Constitutional:       Appearance: She is well-developed.   Neck:      Thyroid: No thyromegaly.   Cardiovascular:      Rate and Rhythm: Normal rate and regular rhythm.      Heart sounds: Normal heart sounds.   Pulmonary:      Effort: Pulmonary effort is normal.      Breath sounds: Normal breath sounds.   Chest:   Breasts:     Right: No inverted nipple, mass, nipple discharge, skin change or tenderness.      Left: No inverted nipple, mass, nipple discharge, skin change or tenderness.   Abdominal:      General: Bowel sounds are normal.      Palpations: Abdomen is soft.   Genitourinary:     Exam position: Supine.      Labia:         Right: No rash, tenderness, lesion or injury.         Left: No rash, tenderness, lesion or injury.       Vagina: No signs of injury and foreign body. " No vaginal discharge, erythema, tenderness or bleeding.      Cervix: No cervical motion tenderness, discharge or friability.      Uterus: Not deviated, not enlarged, not fixed and not tender.       Adnexa:         Right: No mass, tenderness or fullness.          Left: No mass, tenderness or fullness.        Rectum: Normal.   Musculoskeletal:         General: Normal range of motion.      Cervical back: Normal range of motion and neck supple.   Skin:     General: Skin is warm and dry.   Neurological:      General: No focal deficit present.      Mental Status: She is alert and oriented to person, place, and time.   Psychiatric:         Mood and Affect: Mood normal.         Behavior: Behavior normal.            Assessment     1) GYN annual well woman exam.   2) Postmenopausal   3) Lichen sclerosis   4) Vaginal atrophy      Plan     1) Breast Health - Clinical breast exam & mammogram yearly, Self breast awareness. Schedule screening mammogram.   2) Pap - Collected today.   3) Lichen sclerosis- Well managed currently. Has Clobetasol for use of needed. Continue to enc monthly self vulvar exams.   4) Vaginal atrophy- Noted on exam. Disc risk associated with use. ERX Vaginal estrogen, enc use.   5) STD- Declines  6) Smoking status- non smoker   7) Colon health - screening colonoscopy recommended if not up to date  8) Bone health - Weight bearing exercise, dietary calcium recommendations and vitamin D  reviewed.     Follow up prn and one year    NITHIN Cox  11/9/2022  10:03 EST

## 2022-11-10 ENCOUNTER — TELEPHONE (OUTPATIENT)
Dept: OBSTETRICS AND GYNECOLOGY | Facility: CLINIC | Age: 57
End: 2022-11-10

## 2022-12-15 ENCOUNTER — TRANSCRIBE ORDERS (OUTPATIENT)
Dept: ADMINISTRATIVE | Facility: HOSPITAL | Age: 57
End: 2022-12-15

## 2022-12-15 DIAGNOSIS — Z12.31 VISIT FOR SCREENING MAMMOGRAM: Primary | ICD-10-CM

## 2023-01-04 ENCOUNTER — HOSPITAL ENCOUNTER (OUTPATIENT)
Dept: MAMMOGRAPHY | Facility: HOSPITAL | Age: 58
Discharge: HOME | End: 2023-01-04
Admitting: NURSE PRACTITIONER
Payer: COMMERCIAL

## 2023-01-04 DIAGNOSIS — Z12.31 VISIT FOR SCREENING MAMMOGRAM: ICD-10-CM

## 2023-01-04 PROCEDURE — 77063 BREAST TOMOSYNTHESIS BI: CPT

## 2023-01-04 PROCEDURE — 77067 SCR MAMMO BI INCL CAD: CPT

## 2023-08-17 RX ORDER — TRIAMCINOLONE ACETONIDE 5 MG/G
OINTMENT TOPICAL
Qty: 15 G | Refills: 0 | Status: SHIPPED | OUTPATIENT
Start: 2023-08-17

## 2024-02-13 ENCOUNTER — TRANSCRIBE ORDERS (OUTPATIENT)
Dept: MAMMOGRAPHY | Facility: HOSPITAL | Age: 59
End: 2024-02-13

## 2024-02-13 DIAGNOSIS — Z12.31 BREAST CANCER SCREENING BY MAMMOGRAM: Primary | ICD-10-CM

## 2024-02-19 ENCOUNTER — OFFICE VISIT (OUTPATIENT)
Dept: OBSTETRICS AND GYNECOLOGY | Facility: CLINIC | Age: 59
End: 2024-02-19
Payer: COMMERCIAL

## 2024-02-19 VITALS — WEIGHT: 168 LBS | BODY MASS INDEX: 30.91 KG/M2 | HEIGHT: 62 IN

## 2024-02-19 DIAGNOSIS — Z01.419 PAP SMEAR, AS PART OF ROUTINE GYNECOLOGICAL EXAMINATION: Primary | ICD-10-CM

## 2024-02-19 DIAGNOSIS — Z01.419 ROUTINE GYNECOLOGICAL EXAMINATION: ICD-10-CM

## 2024-02-19 DIAGNOSIS — Z11.51 SPECIAL SCREENING EXAMINATION FOR HUMAN PAPILLOMAVIRUS (HPV): ICD-10-CM

## 2024-02-19 DIAGNOSIS — Z01.419 WELL WOMAN EXAM WITH ROUTINE GYNECOLOGICAL EXAM: ICD-10-CM

## 2024-02-19 DIAGNOSIS — R31.9 HEMATURIA, UNSPECIFIED TYPE: ICD-10-CM

## 2024-02-19 LAB
BILIRUB BLD-MCNC: NEGATIVE MG/DL
CLARITY, POC: CLEAR
COLOR UR: YELLOW
GLUCOSE UR STRIP-MCNC: NEGATIVE MG/DL
KETONES UR QL: NEGATIVE
LEUKOCYTE EST, POC: NEGATIVE
NITRITE UR-MCNC: NEGATIVE MG/ML
PH UR: 5 [PH] (ref 5–8)
PROT UR STRIP-MCNC: NEGATIVE MG/DL
RBC # UR STRIP: ABNORMAL /UL
SP GR UR: 1 (ref 1–1.03)
UROBILINOGEN UR QL: NORMAL

## 2024-02-19 PROCEDURE — 81002 URINALYSIS NONAUTO W/O SCOPE: CPT | Performed by: NURSE PRACTITIONER

## 2024-02-19 PROCEDURE — 99396 PREV VISIT EST AGE 40-64: CPT | Performed by: NURSE PRACTITIONER

## 2024-02-19 RX ORDER — TRIAMCINOLONE ACETONIDE 5 MG/G
OINTMENT TOPICAL 2 TIMES DAILY
Qty: 15 G | Refills: 1 | Status: SHIPPED | OUTPATIENT
Start: 2024-02-19

## 2024-02-19 RX ORDER — HYDROCORTISONE ACETATE 25 MG/1
SUPPOSITORY RECTAL
Qty: 20 SUPPOSITORY | Refills: 2 | Status: SHIPPED | OUTPATIENT
Start: 2024-02-19

## 2024-02-19 NOTE — PROGRESS NOTES
GYN Annual Exam     Chief Complaint   Patient presents with    Gynecologic Exam       Tori Maya is a 57 y.o. female who presents for annual well woman exam.She is an established patient. Her periods are absent since 2016. She denies menopausal symptoms.  No intermenstrual bleeding, spotting, or discharge.     She has a history of lichen sclerosis, she uses Clobetasol as needed, reports it is well controlled. She is not using estrogen cream. She uses coconut oil.     She does her SBE regularly. She is sexually active with her . She is asking for refills of the hemorrhoid suppositories.     Denies any changes in PMH or FmHx.       OB History          0    Para   0    Term   0       0    AB   0    Living   0         SAB   0    IAB   0    Ectopic   0    Molar        Multiple   0    Live Births                    Mammogram:  Dexa scan: Has not had  Colonoscopy: Has not had  Last Pap : 10/22 NIL/HPV neg   History of abnormal Pap smear: no  Family history of uterine, colon or ovarian cancer: no  Family history of breast cancer: no  History of abnormal mammogram: no        Past Medical History:   Diagnosis Date    Anxiety     Colitis     childhood    Epilepsy     age 1 none since     Hypertension     2013    Lichen sclerosus 2014    Lichen sclerosus     Skin disease     gaanular skin ds over joints        Past Surgical History:   Procedure Laterality Date    ANKLE SURGERY      TUBAL ABDOMINAL LIGATION           Current Outpatient Medications:     triamcinolone (KENALOG) 0.5 % ointment, APPLY TO AFFECTED AREA(S) TWO TIMES A DAY, Disp: 15 g, Rfl: 0    Anucort-HC 25 MG suppository, INSERT 1 SUPPOSITORY INTO THE RECTUM TWO TIMES A DAY (Patient not taking: Reported on 2024), Disp: 20 suppository, Rfl: 0    Estrogens Conjugated (PREMARIN) 0.625 MG/GM vaginal cream, Insert  into the vagina 2 (Two) Times a Week. 1g per vagina, twice a week (Patient not taking: Reported on 2024), Disp: 45 g,  "Rfl: 6    Allergies   Allergen Reactions    Penicillins Other (See Comments)     Childhood rx    Latex Rash       Social History     Tobacco Use    Smoking status: Never    Smokeless tobacco: Never   Substance Use Topics    Alcohol use: Yes     Comment: occ    Drug use: No       Family History   Problem Relation Age of Onset    Leukemia Other     Stroke Father     Crohn's disease Mother     Breast cancer Neg Hx     Ovarian cancer Neg Hx     Uterine cancer Neg Hx     Colon cancer Neg Hx        Review of Systems   Constitutional: Negative.    Respiratory: Negative.     Cardiovascular: Negative.    Gastrointestinal: Negative.    Endocrine: Negative.    Genitourinary: Negative.    Musculoskeletal: Negative.    Skin: Negative.    Neurological: Negative.    Psychiatric/Behavioral: Negative.         Ht 157.5 cm (62\")   Wt 76.2 kg (168 lb)   BMI 30.73 kg/m²     Physical Exam  Vitals reviewed.   Constitutional:       Appearance: She is well-developed.   Neck:      Thyroid: No thyromegaly.   Cardiovascular:      Rate and Rhythm: Normal rate and regular rhythm.      Heart sounds: Normal heart sounds.   Pulmonary:      Effort: Pulmonary effort is normal.      Breath sounds: Normal breath sounds.   Chest:   Breasts:     Right: No inverted nipple, mass, nipple discharge, skin change or tenderness.      Left: No inverted nipple, mass, nipple discharge, skin change or tenderness.   Abdominal:      General: Bowel sounds are normal.      Palpations: Abdomen is soft.   Genitourinary:     Exam position: Supine.      Labia:         Right: No rash, tenderness, lesion or injury.         Left: No rash, tenderness, lesion or injury.       Vagina: No signs of injury and foreign body. No vaginal discharge, erythema, tenderness or bleeding.      Cervix: No cervical motion tenderness, discharge or friability.      Uterus: Not deviated, not enlarged, not fixed and not tender.       Adnexa:         Right: No mass, tenderness or fullness.        "   Left: No mass, tenderness or fullness.        Rectum: Normal.      Comments: Atrophy noted on exam   Musculoskeletal:         General: Normal range of motion.      Cervical back: Normal range of motion and neck supple.   Skin:     General: Skin is warm and dry.   Neurological:      General: No focal deficit present.      Mental Status: She is alert and oriented to person, place, and time.   Psychiatric:         Mood and Affect: Mood normal.         Behavior: Behavior normal.            Assessment     1) GYN annual well woman exam.   2) Postmenopausal   3) Lichen sclerosis   4) Vaginal atrophy   5) Hematuria      Plan     1) Breast Health - Clinical breast exam & mammogram yearly, Self breast awareness. Schedule screening mammogram scheduled for 2/27/24.   2) Pap - Collected today.   3) Lichen sclerosis- managed well. ERX triamcinolone cream per pt request.   4) Vaginal atrophy- Declines estrogen cream. Continue coconut oil.   5) STD- Declines  6) Smoking status- non smoker   7) Colon health - screening colonoscopy recommended if not up to date. Declines ref or cologuard.  8) Bone health - Weight bearing exercise, dietary calcium recommendations and vitamin D  reviewed.   9) Hematuria: Check urine cx     Follow up prn and one year    Roma Sosa, APRN  2/19/2024  10:09 EST

## 2024-02-21 LAB
BACTERIA UR CULT: NORMAL
BACTERIA UR CULT: NORMAL
CYTOLOGIST CVX/VAG CYTO: NORMAL
CYTOLOGY CVX/VAG DOC CYTO: NORMAL
CYTOLOGY CVX/VAG DOC THIN PREP: NORMAL
DX ICD CODE: NORMAL
HIV 1 & 2 AB SER-IMP: NORMAL
HPV I/H RISK 4 DNA CVX QL PROBE+SIG AMP: NEGATIVE
OTHER STN SPEC: NORMAL
STAT OF ADQ CVX/VAG CYTO-IMP: NORMAL

## 2024-03-01 ENCOUNTER — HOSPITAL ENCOUNTER (OUTPATIENT)
Dept: MAMMOGRAPHY | Facility: HOSPITAL | Age: 59
Discharge: HOME | End: 2024-03-01
Admitting: FAMILY MEDICINE
Payer: COMMERCIAL

## 2024-03-01 DIAGNOSIS — Z12.31 BREAST CANCER SCREENING BY MAMMOGRAM: ICD-10-CM

## 2024-03-01 PROCEDURE — 77063 BREAST TOMOSYNTHESIS BI: CPT

## 2024-03-01 PROCEDURE — 77067 SCR MAMMO BI INCL CAD: CPT

## 2025-04-15 ENCOUNTER — TELEPHONE (OUTPATIENT)
Dept: OBSTETRICS AND GYNECOLOGY | Facility: CLINIC | Age: 60
End: 2025-04-15

## 2025-04-15 ENCOUNTER — HOSPITAL ENCOUNTER (EMERGENCY)
Facility: HOSPITAL | Age: 60
Discharge: HOME | End: 2025-04-15
Attending: EMERGENCY MEDICINE | Admitting: EMERGENCY MEDICINE
Payer: COMMERCIAL

## 2025-04-15 VITALS
BODY MASS INDEX: 28.7 KG/M2 | RESPIRATION RATE: 17 BRPM | OXYGEN SATURATION: 98 % | DIASTOLIC BLOOD PRESSURE: 105 MMHG | HEIGHT: 61 IN | WEIGHT: 152 LBS | HEART RATE: 94 BPM | TEMPERATURE: 98.2 F | SYSTOLIC BLOOD PRESSURE: 155 MMHG

## 2025-04-15 DIAGNOSIS — I10 PRIMARY HYPERTENSION: Primary | ICD-10-CM

## 2025-04-15 LAB
ALBUMIN SERPL-MCNC: 4.5 G/DL (ref 3.5–5.2)
ALBUMIN/GLOB SERPL: 1.4 G/DL
ALP SERPL-CCNC: 131 U/L (ref 39–117)
ALT SERPL W P-5'-P-CCNC: 16 U/L (ref 1–33)
ANION GAP SERPL CALCULATED.3IONS-SCNC: 12.9 MMOL/L (ref 5–15)
AST SERPL-CCNC: 18 U/L (ref 1–32)
BACTERIA UR QL AUTO: ABNORMAL /HPF
BASOPHILS # BLD AUTO: 0.05 10*3/MM3 (ref 0–0.2)
BASOPHILS NFR BLD AUTO: 0.7 % (ref 0–1.5)
BILIRUB SERPL-MCNC: 0.4 MG/DL (ref 0–1.2)
BILIRUB UR QL STRIP: NEGATIVE
BUN SERPL-MCNC: 12 MG/DL (ref 8–23)
BUN/CREAT SERPL: 14.8 (ref 7–25)
CALCIUM SPEC-SCNC: 9.6 MG/DL (ref 8.6–10.5)
CHLORIDE SERPL-SCNC: 104 MMOL/L (ref 98–107)
CLARITY UR: CLEAR
CO2 SERPL-SCNC: 25.1 MMOL/L (ref 22–29)
COLOR UR: YELLOW
CREAT SERPL-MCNC: 0.81 MG/DL (ref 0.57–1)
DEPRECATED RDW RBC AUTO: 41.5 FL (ref 37–54)
EGFRCR SERPLBLD CKD-EPI 2021: 82.7 ML/MIN/1.73
EOSINOPHIL # BLD AUTO: 0.13 10*3/MM3 (ref 0–0.4)
EOSINOPHIL NFR BLD AUTO: 1.9 % (ref 0.3–6.2)
ERYTHROCYTE [DISTWIDTH] IN BLOOD BY AUTOMATED COUNT: 13 % (ref 12.3–15.4)
GLOBULIN UR ELPH-MCNC: 3.2 GM/DL
GLUCOSE SERPL-MCNC: 127 MG/DL (ref 65–99)
GLUCOSE UR STRIP-MCNC: NEGATIVE MG/DL
HCT VFR BLD AUTO: 43.6 % (ref 34–46.6)
HGB BLD-MCNC: 15 G/DL (ref 12–15.9)
HGB UR QL STRIP.AUTO: ABNORMAL
HYALINE CASTS UR QL AUTO: ABNORMAL /LPF
IMM GRANULOCYTES # BLD AUTO: 0.03 10*3/MM3 (ref 0–0.05)
IMM GRANULOCYTES NFR BLD AUTO: 0.4 % (ref 0–0.5)
KETONES UR QL STRIP: ABNORMAL
LEUKOCYTE ESTERASE UR QL STRIP.AUTO: ABNORMAL
LYMPHOCYTES # BLD AUTO: 1 10*3/MM3 (ref 0.7–3.1)
LYMPHOCYTES NFR BLD AUTO: 14.6 % (ref 19.6–45.3)
MCH RBC QN AUTO: 30.2 PG (ref 26.6–33)
MCHC RBC AUTO-ENTMCNC: 34.4 G/DL (ref 31.5–35.7)
MCV RBC AUTO: 87.7 FL (ref 79–97)
MONOCYTES # BLD AUTO: 0.59 10*3/MM3 (ref 0.1–0.9)
MONOCYTES NFR BLD AUTO: 8.6 % (ref 5–12)
NEUTROPHILS NFR BLD AUTO: 5.03 10*3/MM3 (ref 1.7–7)
NEUTROPHILS NFR BLD AUTO: 73.8 % (ref 42.7–76)
NITRITE UR QL STRIP: NEGATIVE
NRBC BLD AUTO-RTO: 0 /100 WBC (ref 0–0.2)
PH UR STRIP.AUTO: 7 [PH] (ref 5–8)
PLATELET # BLD AUTO: 237 10*3/MM3 (ref 140–450)
PMV BLD AUTO: 9.8 FL (ref 6–12)
POTASSIUM SERPL-SCNC: 3.9 MMOL/L (ref 3.5–5.2)
PROT SERPL-MCNC: 7.7 G/DL (ref 6–8.5)
PROT UR QL STRIP: ABNORMAL
QT INTERVAL: 371 MS
QTC INTERVAL: 465 MS
RBC # BLD AUTO: 4.97 10*6/MM3 (ref 3.77–5.28)
RBC # UR STRIP: ABNORMAL /HPF
REF LAB TEST METHOD: ABNORMAL
SODIUM SERPL-SCNC: 142 MMOL/L (ref 136–145)
SP GR UR STRIP: 1.03 (ref 1–1.03)
SQUAMOUS #/AREA URNS HPF: ABNORMAL /HPF
TROPONIN T SERPL HS-MCNC: 7 NG/L
UROBILINOGEN UR QL STRIP: ABNORMAL
WBC # UR STRIP: ABNORMAL /HPF
WBC NRBC COR # BLD AUTO: 6.83 10*3/MM3 (ref 3.4–10.8)

## 2025-04-15 PROCEDURE — 84484 ASSAY OF TROPONIN QUANT: CPT | Performed by: EMERGENCY MEDICINE

## 2025-04-15 PROCEDURE — 93005 ELECTROCARDIOGRAM TRACING: CPT | Performed by: EMERGENCY MEDICINE

## 2025-04-15 PROCEDURE — 81001 URINALYSIS AUTO W/SCOPE: CPT | Performed by: EMERGENCY MEDICINE

## 2025-04-15 PROCEDURE — 99283 EMERGENCY DEPT VISIT LOW MDM: CPT

## 2025-04-15 PROCEDURE — 85025 COMPLETE CBC W/AUTO DIFF WBC: CPT | Performed by: EMERGENCY MEDICINE

## 2025-04-15 PROCEDURE — 80053 COMPREHEN METABOLIC PANEL: CPT | Performed by: EMERGENCY MEDICINE

## 2025-04-15 RX ORDER — METOPROLOL TARTRATE 25 MG/1
25 TABLET, FILM COATED ORAL 2 TIMES DAILY
Qty: 60 TABLET | Refills: 0 | Status: SHIPPED | OUTPATIENT
Start: 2025-04-15

## 2025-04-15 RX ORDER — METOPROLOL TARTRATE 25 MG/1
25 TABLET, FILM COATED ORAL ONCE
Status: COMPLETED | OUTPATIENT
Start: 2025-04-15 | End: 2025-04-15

## 2025-04-15 RX ORDER — SODIUM CHLORIDE 0.9 % (FLUSH) 0.9 %
10 SYRINGE (ML) INJECTION AS NEEDED
Status: DISCONTINUED | OUTPATIENT
Start: 2025-04-15 | End: 2025-04-15 | Stop reason: HOSPADM

## 2025-04-15 RX ADMIN — METOPROLOL TARTRATE 25 MG: 25 TABLET, FILM COATED ORAL at 12:55

## 2025-04-15 NOTE — ED NOTES
Patient to [facility] via car from pcp for hypertension     Patient went to pcp for allergies has been taking OTC medications    Patient has no s/s of hypertension is not on medication

## 2025-04-15 NOTE — ED PROVIDER NOTES
EMERGENCY DEPARTMENT ENCOUNTER  Room Number:  32/32  PCP: Provider, No Known  Independent Historians: Patient      HPI:  Chief Complaint: had concerns including Hypertension.     A complete HPI/ROS/PMH/PSH/SH/FH are unobtainable due to: None    Chronic or social conditions impacting patient care (Social Determinants of Health): None      Context: Tori Maya is a 60 y.o. female with a medical history of lichen sclerosis and vaginal atrophy who presents to the ED c/o acute uncontrolled hypertension.  Patient has not seen primary care in quite some time.  She tried to establish an appointment last week but when she got to the office she did not get along with staff and decided not to be seen there.  Today she was at the [facility] for sinus allergies and her blood pressure was checked and found to be exceedingly high.  Patient denies headache, chest pain, dyspnea, abdominal pain nausea or vomiting.      Review of prior external notes (non-ED) -and- Review of prior external test results outside of this encounter:    Office note 2/19/2024.  Patient seen for regular health checkup      PAST MEDICAL HISTORY  Active Ambulatory Problems     Diagnosis Date Noted    Lichen sclerosus 04/07/2017    Anxiety 04/07/2017    Purpura 06/06/2019    Vaginal atrophy 06/22/2020    Well woman exam with routine gynecological exam 06/22/2020    Postmenopausal 06/22/2020    Vulvar atrophy 02/15/2022    Routine gynecological examination 11/09/2022     Resolved Ambulatory Problems     Diagnosis Date Noted    No Resolved Ambulatory Problems     Past Medical History:   Diagnosis Date    Colitis     Epilepsy     Hypertension     Skin disease          PAST SURGICAL HISTORY  Past Surgical History:   Procedure Laterality Date    ANKLE SURGERY      TUBAL ABDOMINAL LIGATION  1992         FAMILY HISTORY  Family History   Problem Relation Age of Onset    Leukemia Other     Stroke Father     Crohn's disease Mother     Breast cancer Neg Hx      Ovarian cancer Neg Hx     Uterine cancer Neg Hx     Colon cancer Neg Hx          SOCIAL HISTORY  Social History     Socioeconomic History    Marital status:    Tobacco Use    Smoking status: Never    Smokeless tobacco: Never   Substance and Sexual Activity    Alcohol use: Yes     Comment: occ    Drug use: No    Sexual activity: Yes     Partners: Male     Birth control/protection: Surgical     Comment: tubal         ALLERGIES  Penicillins and Latex      REVIEW OF SYSTEMS  Review of Systems  Included in HPI  All systems reviewed and negative except for those discussed in HPI.      PHYSICAL EXAM    I have reviewed the triage vital signs and nursing notes.    ED Triage Vitals   Temp Heart Rate Resp BP SpO2   04/15/25 1107 04/15/25 1107 04/15/25 1107 04/15/25 1109 04/15/25 1107   98.2 °F (36.8 °C) 114 16 (!) 193/121 97 %      Temp src Heart Rate Source Patient Position BP Location FiO2 (%)   -- -- -- -- --              Physical Exam    Physical Exam   Constitutional: No distress.  Nontoxic  HENT:  Head: Normocephalic and atraumatic.   Oropharynx: Mucous membranes are moist.   Eyes: . No scleral icterus. No conjunctival pallor.  Neck: Normal range of motion. Neck supple.   Cardiovascular: Pink warm and well perfused throughout.  Regular  Pulmonary/Chest: No respiratory distress.  No tachypnea or increased work of breathing appreciated.    Abdominal: Soft. There is no tenderness. There is no rebound and no guarding.  Benign  Musculoskeletal: Moves all extremities equally.    Neurological: Alert and oriented.  No acute focal deficit appreciated.  Stable gait and station  Skin: Skin is pink, warm, and dry.   Psychiatric: Mood and affect normal.   Nursing note and vitals reviewed.             LAB RESULTS  Recent Results (from the past 24 hours)   ECG 12 Lead Electrolyte Imbalance    Collection Time: 04/15/25 11:39 AM   Result Value Ref Range    QT Interval 371 ms    QTC Interval 465 ms   Urinalysis With Microscopic If  Indicated (No Culture) - Urine, Clean Catch    Collection Time: 04/15/25 11:55 AM    Specimen: Urine, Clean Catch   Result Value Ref Range    Color, UA Yellow Yellow, Straw    Appearance, UA Clear Clear    pH, UA 7.0 5.0 - 8.0    Specific Gravity, UA 1.028 1.005 - 1.030    Glucose, UA Negative Negative    Ketones, UA Trace (A) Negative    Bilirubin, UA Negative Negative    Blood, UA Trace (A) Negative    Protein, UA Trace (A) Negative    Leuk Esterase, UA Trace (A) Negative    Nitrite, UA Negative Negative    Urobilinogen, UA 1.0 E.U./dL 0.2 - 1.0 E.U./dL   Urinalysis, Microscopic Only - Urine, Clean Catch    Collection Time: 04/15/25 11:55 AM    Specimen: Urine, Clean Catch   Result Value Ref Range    RBC, UA 6-10 (A) None Seen, 0-2 /HPF    WBC, UA 0-2 None Seen, 0-2 /HPF    Bacteria, UA None Seen None Seen /HPF    Squamous Epithelial Cells, UA 3-6 (A) None Seen, 0-2 /HPF    Hyaline Casts, UA None Seen None Seen /LPF    Methodology Automated Microscopy    Comprehensive Metabolic Panel    Collection Time: 04/15/25 12:13 PM    Specimen: Blood   Result Value Ref Range    Glucose 127 (H) 65 - 99 mg/dL    BUN 12 8 - 23 mg/dL    Creatinine 0.81 0.57 - 1.00 mg/dL    Sodium 142 136 - 145 mmol/L    Potassium 3.9 3.5 - 5.2 mmol/L    Chloride 104 98 - 107 mmol/L    CO2 25.1 22.0 - 29.0 mmol/L    Calcium 9.6 8.6 - 10.5 mg/dL    Total Protein 7.7 6.0 - 8.5 g/dL    Albumin 4.5 3.5 - 5.2 g/dL    ALT (SGPT) 16 1 - 33 U/L    AST (SGOT) 18 1 - 32 U/L    Alkaline Phosphatase 131 (H) 39 - 117 U/L    Total Bilirubin 0.4 0.0 - 1.2 mg/dL    Globulin 3.2 gm/dL    A/G Ratio 1.4 g/dL    BUN/Creatinine Ratio 14.8 7.0 - 25.0    Anion Gap 12.9 5.0 - 15.0 mmol/L    eGFR 82.7 >60.0 mL/min/1.73   High Sensitivity Troponin T    Collection Time: 04/15/25 12:13 PM    Specimen: Blood   Result Value Ref Range    HS Troponin T 7 <14 ng/L   CBC Auto Differential    Collection Time: 04/15/25 12:13 PM    Specimen: Blood   Result Value Ref Range    WBC  6.83 3.40 - 10.80 10*3/mm3    RBC 4.97 3.77 - 5.28 10*6/mm3    Hemoglobin 15.0 12.0 - 15.9 g/dL    Hematocrit 43.6 34.0 - 46.6 %    MCV 87.7 79.0 - 97.0 fL    MCH 30.2 26.6 - 33.0 pg    MCHC 34.4 31.5 - 35.7 g/dL    RDW 13.0 12.3 - 15.4 %    RDW-SD 41.5 37.0 - 54.0 fl    MPV 9.8 6.0 - 12.0 fL    Platelets 237 140 - 450 10*3/mm3    Neutrophil % 73.8 42.7 - 76.0 %    Lymphocyte % 14.6 (L) 19.6 - 45.3 %    Monocyte % 8.6 5.0 - 12.0 %    Eosinophil % 1.9 0.3 - 6.2 %    Basophil % 0.7 0.0 - 1.5 %    Immature Grans % 0.4 0.0 - 0.5 %    Neutrophils, Absolute 5.03 1.70 - 7.00 10*3/mm3    Lymphocytes, Absolute 1.00 0.70 - 3.10 10*3/mm3    Monocytes, Absolute 0.59 0.10 - 0.90 10*3/mm3    Eosinophils, Absolute 0.13 0.00 - 0.40 10*3/mm3    Basophils, Absolute 0.05 0.00 - 0.20 10*3/mm3    Immature Grans, Absolute 0.03 0.00 - 0.05 10*3/mm3    nRBC 0.0 0.0 - 0.2 /100 WBC         RADIOLOGY  No Radiology Exams Resulted Within Past 24 Hours      MEDICATIONS GIVEN IN ER  Medications   sodium chloride 0.9 % flush 10 mL (has no administration in time range)   metoprolol tartrate (LOPRESSOR) tablet 25 mg (25 mg Oral Given 4/15/25 1255)         ORDERS PLACED DURING THIS VISIT:  Orders Placed This Encounter   Procedures    Comprehensive Metabolic Panel    High Sensitivity Troponin T    CBC Auto Differential    Urinalysis With Microscopic If Indicated (No Culture) - Urine, Clean Catch    Urinalysis, Microscopic Only - Urine, Clean Catch    Monitor Blood Pressure    ECG 12 Lead Electrolyte Imbalance    Insert Peripheral IV    CBC & Differential         OUTPATIENT MEDICATION MANAGEMENT:  Current Facility-Administered Medications Ordered in Epic   Medication Dose Route Frequency Provider Last Rate Last Admin    sodium chloride 0.9 % flush 10 mL  10 mL Intravenous PRN Neel Talavera MD         Current Outpatient Medications Ordered in Epic   Medication Sig Dispense Refill    Estrogens Conjugated (PREMARIN) 0.625 MG/GM vaginal cream Insert  into  the vagina 2 (Two) Times a Week. 1g per vagina, twice a week (Patient not taking: Reported on 2/19/2024) 45 g 6    hydrocortisone (Anucort-HC) 25 MG suppository Insert 1 suppository into the rectum two times a day 20 suppository 2    metoprolol tartrate (LOPRESSOR) 25 MG tablet Take 1 tablet by mouth 2 (Two) Times a Day. 60 tablet 0    triamcinolone (KENALOG) 0.5 % ointment Apply  topically to the appropriate area as directed 2 (Two) Times a Day. 15 g 1         PROCEDURES  Procedures            PROGRESS, DATA ANALYSIS, CONSULTS, AND MEDICAL DECISION MAKING  All labs have been independently interpreted by me.  All radiology studies have been reviewed by me. All EKGs have been independently viewed and interpreted by me.  Discussion below represents my analysis of pertinent findings related to patient's condition, differential diagnosis, treatment plan and final disposition.    Differential diagnosis:  Uncontrolled hypertension: Essential hypertension, renal artery stenosis, whitecoat syndrome…    Clinical Scores:                  ED Course as of 04/15/25 1321   Tue Apr 15, 2025   1144 EKG           EKG time: 1139  Rhythm/Rate: Sinus, 95  P waves and DE: MARYANNE within normal  QRS, axis: Narrow complex  ST and T waves: No STEMI; QTc within normal limits    Interpreted Contemporaneously by me, independently viewed  Comparison: Not available   [RS]   1236 Protein, UA(!): Trace [RS]   1237 RBC, UA(!): 6-10  Blood cells explains the trace protein [RS]   1237 WBC: 6.83 [RS]   1237 Hemoglobin: 15.0 [RS]   1237 Platelets: 237 [RS]   1314 HS Troponin T: 7 [RS]   1315 BUN: 12 [RS]   1315 Creatinine: 0.81 [RS]   1315 ALT (SGPT): 16 [RS]   1315 AST (SGOT): 18 [RS]   1315 Total Bilirubin: 0.4 [RS]   1315 BP: 161/98 [RS]   1321 Reviewed all findings with patient and significant other.  Agreeable with discharge planning and close outpatient follow-up.  Initiate metoprolol at home.  Patient agreeable. [RS]      ED Course User Index  [RS]  Neel Talavera MD         Prescription drug monitoring program review:     AS OF 13:21 EDT VITALS:    BP - 161/98  HR - 94  TEMP - 98.2 °F (36.8 °C)  O2 SATS - 98%    COMPLEXITY OF CARE  Admission was considered but after careful review of the patient's presentation, physical examination, diagnostic results, and response to treatment the patient may be safely discharged with outpatient follow-up.      DIAGNOSIS  Final diagnoses:   Primary hypertension         DISPOSITION  ED Disposition       ED Disposition   Discharge    Condition   Stable    Comment   --                  DISCHARGE    Patient discharged in stable condition.    Reviewed implications of results, diagnosis, meds, responsibility to follow up, warning signs and symptoms of possible worsening, potential complications and reasons to return to ER.    Patient/Family voiced understanding of above instructions.    Discussed plan for discharge, as there is no emergent indication for admission. Patient referred to primary care provider for regular health maintenance. Pt/family is agreeable and understands need for follow up and possible repeat testing.  Pt is aware that discharge does not mean that nothing is wrong but it indicates no emergency is present that requires admission and they must continue care with follow-up as given below or physician of their choice.     FOLLOW-UP  Asim Mari MD  [address] [facility] [address] [phone]    Schedule an appointment as soon as possible for a visit in 1 week  Establish primary care and follow-up on elevated blood pressure reading         Medication List        New Prescriptions      metoprolol tartrate 25 MG tablet  Commonly known as: LOPRESSOR  Take 1 tablet by mouth 2 (Two) Times a Day.               Where to Get Your Medications        These medications were sent to [facility] [id number] [facility] [address] [facility]  AT [address] [phone] [facility] [phone]  [place]  [address] [facility] , [facility] [address]      Phone: [phone]   metoprolol tartrate 25 MG tablet           Please note that portions of this document were completed with a voice recognition program.    Note Disclaimer: At [facility], we believe that sharing information builds trust and better relationships. You are receiving this note because you recently visited [facility]. It is possible you will see health information before a provider has talked with you about it. This kind of information can be easy to misunderstand. To help you fully understand what it means for your health, we urge you to discuss this note with your provider.         Neel Talavera MD  04/15/25 3221

## 2025-04-15 NOTE — TELEPHONE ENCOUNTER
Caller: Tori Maya    Relationship:  Self    Best call back number: [phone]     PATIENT CALLED REQUESTING TO CANCEL SAME DAY APPT.    Did the patient call AFTER the start time of their scheduled appointment?  []YES  [x]NO    Was the patient's appointment rescheduled? [x]YES  []NO 05/22/25     Any additional information: PATIENT CALLED TO RESCHEDULE TODAY'S ANNUAL WITH TAYLER BURRELL APRN AT 2:30 PM.

## 2025-04-21 ENCOUNTER — PATIENT ROUNDING (BHMG ONLY) (OUTPATIENT)
Dept: FAMILY MEDICINE CLINIC | Facility: CLINIC | Age: 60
End: 2025-04-21

## 2025-04-21 ENCOUNTER — OFFICE VISIT (OUTPATIENT)
Dept: FAMILY MEDICINE CLINIC | Facility: CLINIC | Age: 60
End: 2025-04-21
Payer: COMMERCIAL

## 2025-04-21 VITALS
BODY MASS INDEX: 31.1 KG/M2 | WEIGHT: 164.7 LBS | OXYGEN SATURATION: 97 % | TEMPERATURE: 97.8 F | DIASTOLIC BLOOD PRESSURE: 90 MMHG | HEART RATE: 91 BPM | HEIGHT: 61 IN | SYSTOLIC BLOOD PRESSURE: 140 MMHG

## 2025-04-21 DIAGNOSIS — N95.2 VAGINAL ATROPHY: ICD-10-CM

## 2025-04-21 DIAGNOSIS — L90.0 LICHEN SCLEROSUS: ICD-10-CM

## 2025-04-21 DIAGNOSIS — E66.9 OBESITY (BMI 30-39.9): ICD-10-CM

## 2025-04-21 DIAGNOSIS — Z00.00 ANNUAL PHYSICAL EXAM: Primary | ICD-10-CM

## 2025-04-21 DIAGNOSIS — Z76.89 ENCOUNTER TO ESTABLISH CARE: ICD-10-CM

## 2025-04-21 DIAGNOSIS — J30.2 SEASONAL ALLERGIC RHINITIS, UNSPECIFIED TRIGGER: ICD-10-CM

## 2025-04-21 DIAGNOSIS — I10 PRIMARY HYPERTENSION: ICD-10-CM

## 2025-04-21 DIAGNOSIS — F41.9 ANXIETY: ICD-10-CM

## 2025-04-21 PROCEDURE — 99396 PREV VISIT EST AGE 40-64: CPT

## 2025-04-21 PROCEDURE — 99214 OFFICE O/P EST MOD 30 MIN: CPT

## 2025-04-21 RX ORDER — BENZONATATE 200 MG/1
CAPSULE ORAL
COMMUNITY
Start: 2025-04-15 | End: 2025-04-21

## 2025-04-21 RX ORDER — FLUTICASONE PROPIONATE 50 MCG
SPRAY, SUSPENSION (ML) NASAL
COMMUNITY
Start: 2025-04-15

## 2025-04-21 NOTE — PROGRESS NOTES
April 21, 2025    I am  with [facility]  [address] [facility] [address] [phone].    Tell me about your visit with us.   What things went well?  Everything, she was great.        We're always looking for ways to make our patients' experiences even better. Do you have recommendations on ways we may improve?  no    Overall were you satisfied with your first visit to our practice? yes       I appreciate you taking the time to speak with me today. Is there anything else I can do for you? no      Thank you, and have a great day.

## 2025-04-21 NOTE — PROGRESS NOTES
Patient or patient representative verbalized consent for the use of Ambient Listening during the visit with  NITHIN Cervantes for chart documentation. 4/21/2025  12:51 EDT    Chief Complaint   Patient presents with    [facility]    Hypertension       Patient Care Team:  Ema Lui APRN as PCP - General (Family Medicine)  Roma Sosa APRN as Nurse Practitioner (Obstetrics and Gynecology)    Subjective   Tori Maya is a 60 y.o. female and is here for a yearly physical exam. The patient reports problems - high blood pressure .    History of Present Illness  Tori Maya is here today to establish care and for management of hypertension.  She is following up from a recent ED visit on 4/15/2025 with acute uncontrolled hypertension and a blood pressure of 193/121.  She initially presented to urgent care for treatment of sinus/allergies and blood pressure check was found to be exceedingly high.  At that point she was sent to the ED.  She was started on metoprolol 25 mg twice daily in the ER with repeat blood pressure of 161/98 post administration.  She also has history significant for for lichen sclerosis and vaginal atrophy.    The chief complaint is elevated blood pressure. She has been on metoprolol for 6 days and reports no side effects. Today's blood pressure is 140/90. White coat syndrome is acknowledged, but the patient is aware that previous extremely high readings are not solely due to anxiety.    A history of anxiety is noted, and she is scheduled to see a psychiatrist on Wednesday. She uses THC gummies to aid sleep and manage anxiety.    Allergies have significantly improved but are not completely resolved. She reports no new allergy symptoms.    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 She had an ankle surgery in 2011 due to an injury. Arthritis  is present in her index finger. She underwent a tubal ligation many years ago. 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 Occasional headaches are believed to be stress-related.    PAST SURGICAL HISTORY:  - Ankle surgery in 2011  - Tubal ligation many years ago    SOCIAL HISTORY  She does not smoke cigarettes. She drinks alcohol 0 to 1 times per week. She uses gummies to help her sleep. She does not use vaping or e-cigarettes. She is self-employed and works from home as an . She has no children.    FAMILY HISTORY  Her mother has Crohn's disease. Her father had a heart attack and has four stents. Her great-grandfather had leukemia.    Do you take any herbs or supplements that were not prescribed by a doctor? no. If so, these will be added to active medication list.    Health Habits:  Dental Exam: Up to date  Eye Exam: Up to date  Diet: She and her  have been eating a heart healthy diet since his heart attack 2 months ago.  Prior to that she endorses eating lots of high-fat foods such as beanie when he is in [place].  Exercise: Several days per week  Current exercise activities include: Horseback riding  Pap: 2/24 followed by Dr. Sosa in OB/GYN.  Mammogram: 2/24 SUNGADS1, due. Managed by OB/Gyn  Dexa: never  Colonoscopy: never    The following portions of the patient's history were reviewed and updated as appropriate: allergies, current medications, past family history, past medical history, past social history, past surgical history, and problem list.    Social and Family and Surgical History reviewed and updated today, see Rooming tab.    Health History, Preventive Measures and Vaccination flow sheets reviewed and updated today.    Patient's current medical chart in Epic; including previous office notes, imaging, labs, specialist's evaluation either in notes or in Media tab reviewed today.    Other pertinent medical information also reviewed  "thru Care Everywhere function is also reviewed today.    Results  Labs   - Urinalysis: 04/15/2025, Protein in urine    Review of Systems  Review of Systems  Vitals:    04/21/25 0917   BP: 140/90   Pulse: 91   Temp: 97.8 °F (36.6 °C)   TempSrc: Infrared   SpO2: 97%   Weight: 74.7 kg (164 lb 11.2 oz)   Height: 154.9 cm (61\")     Wt Readings from Last 3 Encounters:   04/21/25 74.7 kg (164 lb 11.2 oz)   04/15/25 68.9 kg (152 lb)   02/19/24 76.2 kg (168 lb)       Physical Exam      Physical Exam  Vitals reviewed.   Constitutional:       General: She is not in acute distress.     Appearance: Normal appearance. She is obese.   HENT:      Head: Normocephalic and atraumatic.      Right Ear: Tympanic membrane normal.      Left Ear: Tympanic membrane normal.      Mouth/Throat:      Mouth: Mucous membranes are moist.      Pharynx: Oropharynx is clear. No oropharyngeal exudate.   Eyes:      Conjunctiva/sclera: Conjunctivae normal.      Pupils: Pupils are equal, round, and reactive to light.   Neck:      Thyroid: No thyromegaly.      Vascular: No carotid bruit or JVD.   Cardiovascular:      Rate and Rhythm: Normal rate and regular rhythm.      Pulses: Normal pulses.           Radial pulses are 2+ on the right side and 2+ on the left side.        Posterior tibial pulses are 2+ on the right side and 2+ on the left side.      Heart sounds: Normal heart sounds. No murmur heard.     No friction rub.   Pulmonary:      Effort: Pulmonary effort is normal. No respiratory distress.      Breath sounds: Normal breath sounds. No wheezing or rales.   Abdominal:      General: Abdomen is protuberant. Bowel sounds are normal.      Palpations: Abdomen is soft. There is no mass.      Tenderness: There is no abdominal tenderness.      Hernia: No hernia is present.   Musculoskeletal:      Cervical back: Neck supple.      Right lower leg: No edema.      Left lower leg: No edema.   Lymphadenopathy:      Cervical: No cervical adenopathy.   Skin:     " General: Skin is warm and dry.      Capillary Refill: Capillary refill takes less than 2 seconds.   Neurological:      General: No focal deficit present.      Mental Status: She is alert and oriented to person, place, and time.   Psychiatric:         Mood and Affect: Mood normal.         Behavior: Behavior normal.         BMI is >= 30 and <35. (Class 1 Obesity). The following options were offered after discussion;: weight loss educational material (shared in after visit summary), exercise counseling/recommendations, and nutrition counseling/recommendations       Results for orders placed or performed during the hospital encounter of 04/15/25   ECG 12 Lead Electrolyte Imbalance    Collection Time: 04/15/25 11:39 AM   Result Value Ref Range    QT Interval 371 ms    QTC Interval 465 ms   Urinalysis With Microscopic If Indicated (No Culture) - Urine, Clean Catch    Collection Time: 04/15/25 11:55 AM    Specimen: Urine, Clean Catch   Result Value Ref Range    Color, UA Yellow Yellow, Straw    Appearance, UA Clear Clear    pH, UA 7.0 5.0 - 8.0    Specific Gravity, UA 1.028 1.005 - 1.030    Glucose, UA Negative Negative    Ketones, UA Trace (A) Negative    Bilirubin, UA Negative Negative    Blood, UA Trace (A) Negative    Protein, UA Trace (A) Negative    Leuk Esterase, UA Trace (A) Negative    Nitrite, UA Negative Negative    Urobilinogen, UA 1.0 E.U./dL 0.2 - 1.0 E.U./dL   Urinalysis, Microscopic Only - Urine, Clean Catch    Collection Time: 04/15/25 11:55 AM    Specimen: Urine, Clean Catch   Result Value Ref Range    RBC, UA 6-10 (A) None Seen, 0-2 /HPF    WBC, UA 0-2 None Seen, 0-2 /HPF    Bacteria, UA None Seen None Seen /HPF    Squamous Epithelial Cells, UA 3-6 (A) None Seen, 0-2 /HPF    Hyaline Casts, UA None Seen None Seen /LPF    Methodology Automated Microscopy    Comprehensive Metabolic Panel    Collection Time: 04/15/25 12:13 PM    Specimen: Blood   Result Value Ref Range    Glucose 127 (H) 65 - 99 mg/dL    BUN  12 8 - 23 mg/dL    Creatinine 0.81 0.57 - 1.00 mg/dL    Sodium 142 136 - 145 mmol/L    Potassium 3.9 3.5 - 5.2 mmol/L    Chloride 104 98 - 107 mmol/L    CO2 25.1 22.0 - 29.0 mmol/L    Calcium 9.6 8.6 - 10.5 mg/dL    Total Protein 7.7 6.0 - 8.5 g/dL    Albumin 4.5 3.5 - 5.2 g/dL    ALT (SGPT) 16 1 - 33 U/L    AST (SGOT) 18 1 - 32 U/L    Alkaline Phosphatase 131 (H) 39 - 117 U/L    Total Bilirubin 0.4 0.0 - 1.2 mg/dL    Globulin 3.2 gm/dL    A/G Ratio 1.4 g/dL    BUN/Creatinine Ratio 14.8 7.0 - 25.0    Anion Gap 12.9 5.0 - 15.0 mmol/L    eGFR 82.7 >60.0 mL/min/1.73   High Sensitivity Troponin T    Collection Time: 04/15/25 12:13 PM    Specimen: Blood   Result Value Ref Range    HS Troponin T 7 <14 ng/L   CBC Auto Differential    Collection Time: 04/15/25 12:13 PM    Specimen: Blood   Result Value Ref Range    WBC 6.83 3.40 - 10.80 10*3/mm3    RBC 4.97 3.77 - 5.28 10*6/mm3    Hemoglobin 15.0 12.0 - 15.9 g/dL    Hematocrit 43.6 34.0 - 46.6 %    MCV 87.7 79.0 - 97.0 fL    MCH 30.2 26.6 - 33.0 pg    MCHC 34.4 31.5 - 35.7 g/dL    RDW 13.0 12.3 - 15.4 %    RDW-SD 41.5 37.0 - 54.0 fl    MPV 9.8 6.0 - 12.0 fL    Platelets 237 140 - 450 10*3/mm3    Neutrophil % 73.8 42.7 - 76.0 %    Lymphocyte % 14.6 (L) 19.6 - 45.3 %    Monocyte % 8.6 5.0 - 12.0 %    Eosinophil % 1.9 0.3 - 6.2 %    Basophil % 0.7 0.0 - 1.5 %    Immature Grans % 0.4 0.0 - 0.5 %    Neutrophils, Absolute 5.03 1.70 - 7.00 10*3/mm3    Lymphocytes, Absolute 1.00 0.70 - 3.10 10*3/mm3    Monocytes, Absolute 0.59 0.10 - 0.90 10*3/mm3    Eosinophils, Absolute 0.13 0.00 - 0.40 10*3/mm3    Basophils, Absolute 0.05 0.00 - 0.20 10*3/mm3    Immature Grans, Absolute 0.03 0.00 - 0.05 10*3/mm3    nRBC 0.0 0.0 - 0.2 /100 WBC     Assessment & Plan   Healthy female exam.  Diagnoses and all orders for this visit:    1. Annual physical exam (Primary)  Will check fasting labs today and await results for further recommendation.  -     Lipid Panel  -     Hemoglobin A1c  -      Comprehensive Metabolic Panel  -     CBC (No Diff)  -     TSH Rfx On Abnormal To Free T4  -     Microalbumin / Creatinine Urine Ratio - Urine, Clean Catch  -     Hepatitis C Antibody    2. Primary hypertension  - Blood pressure has shown significant improvement since the initiation of metoprolol therapy, with current readings at 140/90 compared to the previous 193/121. However, it is not yet within the optimal range of <130/80.  - No side effects reported from metoprolol, and it is well tolerated.  - A set of fasting labs will be ordered today to assess cholesterol levels, blood count, thyroid function, kidney and liver function, and blood sugar levels. A urine sample will also be collected for further protein analysis.  - Provided with a blood pressure log and instructed to monitor blood pressure at home for the next few weeks. Information on a heart-healthy diet was provided.  -     Lipid Panel  -     Hemoglobin A1c  -     Comprehensive Metabolic Panel  -     CBC (No Diff)  -     TSH Rfx On Abnormal To Free T4  -     Microalbumin / Creatinine Urine Ratio - Urine, Clean Catch  -     Hepatitis C Antibody    3. Anxiety  - Reports a history of anxiety, which is currently being managed with metoprolol.  - No side effects reported from metoprolol, and it is well tolerated.  - Encouraged to continue current regimen and follow up with psychiatrist as scheduled.  - Discussed the potential benefits of metoprolol in managing anxiety.    4. Obesity (BMI 30-39.9)  -     Lipid Panel  -     Hemoglobin A1c  -     Comprehensive Metabolic Panel  -     CBC (No Diff)  -     TSH Rfx On Abnormal To Free T4  -     Microalbumin / Creatinine Urine Ratio - Urine, Clean Catch  -     Hepatitis C Antibody    5. Lichen sclerosus  6. Vaginal atrophy  Chronic, controlled.  Continue current medication regimen.  Keep follow-ups with OB/GYN.    7. Seasonal allergic rhinitis, unspecified trigger  - Reports improvement in allergy symptoms, although  not completely resolved.  - No new treatment initiated for allergies at this visit.  - Reviewed history of allergies and current status.    8. Encounter to establish care   Reviewed all pertinent medical, family, surgical, and social history today.    Assessment & Plan      1. Will discuss colon cancer screening at her follow-up.  Declines vaccines today.  2. Patient Counseling:  --Nutrition: Stressed importance of moderation in sodium/caffeine intake, saturated fat and cholesterol.  Discussed caloric balance, sufficient intake of fresh fruits, vegetables, fiber,    calcium, iron.  --Exercise: Stressed the importance of regular exercise.   --Substance Abuse: Discussed cessation/primary prevention of tobacco, alcohol, or other drug use; driving or other dangerous activities under the influence.    --Dental health: Discussed importance of regular tooth brushing, flossing, and dental visits.  --Suggested having eyes and vision checked if needed or past due.  --Immunizations reviewed.  --Discussed benefits of screening colonoscopy.  3. Discussed the patient's BMI with her.  The BMI is above average; BMI management plan is completed  4. Follow up in 2 weeks    Patient was given instructions and counseling regarding condition or for health maintenance advice.  Please see specific information pulled into the AVS if appropriate.      Medications Discontinued During This Encounter   Medication Reason    Estrogens Conjugated (PREMARIN) 0.625 MG/GM vaginal cream *Therapy completed    benzonatate (TESSALON) 200 MG capsule *Therapy completed          NITHIN Cervantes  [facility]  [address] [place] [address]

## 2025-04-22 LAB
ALBUMIN SERPL-MCNC: 4.7 G/DL (ref 3.5–5.2)
ALBUMIN/CREAT UR: <17 MG/G CREAT (ref 0–29)
ALBUMIN/GLOB SERPL: 1.8 G/DL
ALP SERPL-CCNC: 128 U/L (ref 39–117)
ALT SERPL-CCNC: 16 U/L (ref 1–33)
AST SERPL-CCNC: 15 U/L (ref 1–32)
BILIRUB SERPL-MCNC: 0.4 MG/DL (ref 0–1.2)
BUN SERPL-MCNC: 14 MG/DL (ref 8–23)
BUN/CREAT SERPL: 16.9 (ref 7–25)
CALCIUM SERPL-MCNC: 10 MG/DL (ref 8.6–10.5)
CHLORIDE SERPL-SCNC: 100 MMOL/L (ref 98–107)
CHOLEST SERPL-MCNC: 196 MG/DL (ref 0–200)
CO2 SERPL-SCNC: 25.3 MMOL/L (ref 22–29)
CREAT SERPL-MCNC: 0.83 MG/DL (ref 0.57–1)
CREAT UR-MCNC: 17.7 MG/DL
EGFRCR SERPLBLD CKD-EPI 2021: 80.3 ML/MIN/1.73
ERYTHROCYTE [DISTWIDTH] IN BLOOD BY AUTOMATED COUNT: 12.7 % (ref 12.3–15.4)
GLOBULIN SER CALC-MCNC: 2.6 GM/DL
GLUCOSE SERPL-MCNC: 125 MG/DL (ref 65–99)
HBA1C MFR BLD: 6.6 % (ref 4.8–5.6)
HCT VFR BLD AUTO: 46.1 % (ref 34–46.6)
HCV IGG SERPL QL IA: NON REACTIVE
HDLC SERPL-MCNC: 44 MG/DL (ref 40–60)
HGB BLD-MCNC: 15.4 G/DL (ref 12–15.9)
LDLC SERPL CALC-MCNC: 126 MG/DL (ref 0–100)
MCH RBC QN AUTO: 29.6 PG (ref 26.6–33)
MCHC RBC AUTO-ENTMCNC: 33.4 G/DL (ref 31.5–35.7)
MCV RBC AUTO: 88.5 FL (ref 79–97)
MICROALBUMIN UR-MCNC: <3 UG/ML
PLATELET # BLD AUTO: 287 10*3/MM3 (ref 140–450)
POTASSIUM SERPL-SCNC: 4.6 MMOL/L (ref 3.5–5.2)
PROT SERPL-MCNC: 7.3 G/DL (ref 6–8.5)
RBC # BLD AUTO: 5.21 10*6/MM3 (ref 3.77–5.28)
SODIUM SERPL-SCNC: 137 MMOL/L (ref 136–145)
TRIGL SERPL-MCNC: 144 MG/DL (ref 0–150)
TSH SERPL DL<=0.005 MIU/L-ACNC: 3 UIU/ML (ref 0.27–4.2)
VLDLC SERPL CALC-MCNC: 26 MG/DL (ref 5–40)
WBC # BLD AUTO: 9.31 10*3/MM3 (ref 3.4–10.8)

## 2025-05-05 ENCOUNTER — OFFICE VISIT (OUTPATIENT)
Dept: FAMILY MEDICINE CLINIC | Facility: CLINIC | Age: 60
End: 2025-05-05
Payer: COMMERCIAL

## 2025-05-05 VITALS
SYSTOLIC BLOOD PRESSURE: 140 MMHG | BODY MASS INDEX: 31 KG/M2 | DIASTOLIC BLOOD PRESSURE: 90 MMHG | TEMPERATURE: 97.3 F | OXYGEN SATURATION: 98 % | WEIGHT: 164.2 LBS | HEART RATE: 89 BPM | HEIGHT: 61 IN

## 2025-05-05 DIAGNOSIS — E11.65 UNCONTROLLED TYPE 2 DIABETES MELLITUS WITH HYPERGLYCEMIA, WITHOUT LONG-TERM CURRENT USE OF INSULIN: ICD-10-CM

## 2025-05-05 DIAGNOSIS — F41.9 ANXIETY: ICD-10-CM

## 2025-05-05 DIAGNOSIS — I10 PRIMARY HYPERTENSION: Primary | ICD-10-CM

## 2025-05-05 DIAGNOSIS — N18.2 CKD (CHRONIC KIDNEY DISEASE) STAGE 2, GFR 60-89 ML/MIN: ICD-10-CM

## 2025-05-05 DIAGNOSIS — E78.00 HYPERCHOLESTEROLEMIA: ICD-10-CM

## 2025-05-05 RX ORDER — METFORMIN HYDROCHLORIDE 500 MG/1
500 TABLET, EXTENDED RELEASE ORAL
Qty: 30 TABLET | Refills: 5 | Status: SHIPPED | OUTPATIENT
Start: 2025-05-05

## 2025-05-05 RX ORDER — LOSARTAN POTASSIUM 25 MG/1
25 TABLET ORAL DAILY
Qty: 30 TABLET | Refills: 5 | Status: SHIPPED | OUTPATIENT
Start: 2025-05-05

## 2025-05-05 RX ORDER — METOPROLOL TARTRATE 25 MG/1
25 TABLET, FILM COATED ORAL 2 TIMES DAILY
Qty: 180 TABLET | Refills: 1 | Status: SHIPPED | OUTPATIENT
Start: 2025-05-05

## 2025-05-05 RX ORDER — METOPROLOL TARTRATE 25 MG/1
25 TABLET, FILM COATED ORAL 2 TIMES DAILY
Qty: 90 TABLET | Refills: 1 | Status: SHIPPED | OUTPATIENT
Start: 2025-05-05 | End: 2025-05-05

## 2025-05-05 RX ORDER — ROSUVASTATIN CALCIUM 5 MG/1
5 TABLET, COATED ORAL DAILY
Qty: 30 TABLET | Refills: 5 | Status: SHIPPED | OUTPATIENT
Start: 2025-05-05

## 2025-05-05 NOTE — PROGRESS NOTES
Patient or patient representative verbalized consent for the use of Ambient Listening during the visit with  NITHIN Cervantes for chart documentation. 5/5/2025  09:40 EDT    Chief Complaint   Patient presents with    Hypertension    Anxiety       Subjective      Patient ID: Tori is a 60 y.o. female.     Chief Complaint   Patient presents with    Hypertension    Anxiety        Hypertension  Associated symptoms: anxiety    Anxiety       History of Present Illness  The patient presents for evaluation of hypertension, diabetes, and hyperlipidemia.    She has been monitoring her blood pressure at home, with recent readings of 137/89, 126/85, 131/98, and 140/86. She attributes these fluctuations to stress. She has been on metoprolol 25mg BID for 20 days and reports having 10 days' worth of medication remaining.    She has a recent A1c of 6.6, indicating diabetes. She has been adhering to a heart-healthy diet, including the consumption of one banana per day with low-sugar peanut butter and crushed walnuts for breakfast. She also consumes small amounts of spaghetti without salt or butter, and a small amount of honey with homemade vinegar dressing. She has been supplementing her diet with fiber pills.  She has a strong family history of diabetes.    Her recent lab results show a total cholesterol of 196 and an LDL cholesterol of 126. She has expressed concerns about potential kidney damage due to her diabetes and the possibility of requiring dialysis in the future. Her kidney function is mildly impaired with a GFR reading of 80.3, and her fasting blood sugar was 125.      PAST SURGICAL HISTORY: Plate pinning    FAMILY HISTORY  Her mother has diabetes.  Her cousin on her father's side has diabetes and has lost her eyesight.       The following portions of the patient's history were reviewed and updated as appropriate: allergies, current medications, past family history, past medical history, past social history,  "past surgical history, and problem list.      Current Outpatient Medications:     fluticasone (FLONASE) 50 MCG/ACT nasal spray, , Disp: , Rfl:     hydrocortisone (Anucort-HC) 25 MG suppository, Insert 1 suppository into the rectum two times a day, Disp: 20 suppository, Rfl: 2    metoprolol tartrate (LOPRESSOR) 25 MG tablet, Take 1 tablet by mouth 2 (Two) Times a Day., Disp: 180 tablet, Rfl: 1    triamcinolone (KENALOG) 0.5 % ointment, Apply  topically to the appropriate area as directed 2 (Two) Times a Day., Disp: 15 g, Rfl: 1    losartan (Cozaar) 25 MG tablet, Take 1 tablet by mouth Daily. Indications: High Blood Pressure, Disp: 30 tablet, Rfl: 5    metFORMIN ER (GLUCOPHAGE-XR) 500 MG 24 hr tablet, Take 1 tablet by mouth Daily With Breakfast. Indications: Type 2 Diabetes, Disp: 30 tablet, Rfl: 5    rosuvastatin (Crestor) 5 MG tablet, Take 1 tablet by mouth Daily., Disp: 30 tablet, Rfl: 5        Results  Labs   - Hepatitis C test: Negative   - Total cholesterol: 196 mg/dL   - LDL cholesterol: 126 mg/dL   - A1c: 6.6%   - Kidney function: 80.3 mL/min   - Fasting blood sugar: 125 mg/dL   - Alk phos: Mildly elevated   - Blood counts: Normal   - Thyroid function: Normal   - Protein in urine: Normal        Objective      /90   Pulse 89   Temp 97.3 °F (36.3 °C) (Infrared)   Ht 154.9 cm (61\")   Wt 74.5 kg (164 lb 3.2 oz)   SpO2 98%   BMI 31.03 kg/m²      Body mass index is 31.03 kg/m².           Physical Exam  Vitals reviewed.   Constitutional:       General: She is not in acute distress.     Appearance: Normal appearance. She is obese.   Eyes:      Pupils: Pupils are equal, round, and reactive to light.   Neck:      Vascular: No carotid bruit or JVD.   Cardiovascular:      Rate and Rhythm: Normal rate and regular rhythm.      Pulses: Normal pulses.           Radial pulses are 2+ on the right side and 2+ on the left side.        Posterior tibial pulses are 2+ on the right side and 2+ on the left side.      Heart " sounds: Normal heart sounds. No murmur heard.     No friction rub.   Pulmonary:      Effort: Pulmonary effort is normal. No respiratory distress.      Breath sounds: Normal breath sounds. No wheezing or rales.   Musculoskeletal:      Cervical back: Neck supple.      Right lower leg: No edema.      Left lower leg: No edema.   Neurological:      General: No focal deficit present.      Mental Status: She is alert.   Psychiatric:         Mood and Affect: Mood normal.         Behavior: Behavior normal.          Physical Exam        Results for orders placed or performed in visit on 04/21/25   Lipid Panel    Collection Time: 04/21/25 10:22 AM    Specimen: Blood   Result Value Ref Range    Total Cholesterol 196 0 - 200 mg/dL    Triglycerides 144 0 - 150 mg/dL    HDL Cholesterol 44 40 - 60 mg/dL    VLDL Cholesterol Олег 26 5 - 40 mg/dL    LDL Chol Calc (NIH) 126 (H) 0 - 100 mg/dL   Hemoglobin A1c    Collection Time: 04/21/25 10:22 AM    Specimen: Blood   Result Value Ref Range    Hemoglobin A1C 6.60 (H) 4.80 - 5.60 %   Comprehensive Metabolic Panel    Collection Time: 04/21/25 10:22 AM    Specimen: Blood   Result Value Ref Range    Glucose 125 (H) 65 - 99 mg/dL    BUN 14 8 - 23 mg/dL    Creatinine 0.83 0.57 - 1.00 mg/dL    EGFR Result 80.3 >60.0 mL/min/1.73    BUN/Creatinine Ratio 16.9 7.0 - 25.0    Sodium 137 136 - 145 mmol/L    Potassium 4.6 3.5 - 5.2 mmol/L    Chloride 100 98 - 107 mmol/L    Total CO2 25.3 22.0 - 29.0 mmol/L    Calcium 10.0 8.6 - 10.5 mg/dL    Total Protein 7.3 6.0 - 8.5 g/dL    Albumin 4.7 3.5 - 5.2 g/dL    Globulin 2.6 gm/dL    A/G Ratio 1.8 g/dL    Total Bilirubin 0.4 0.0 - 1.2 mg/dL    Alkaline Phosphatase 128 (H) 39 - 117 U/L    AST (SGOT) 15 1 - 32 U/L    ALT (SGPT) 16 1 - 33 U/L   CBC (No Diff)    Collection Time: 04/21/25 10:22 AM    Specimen: Blood   Result Value Ref Range    WBC 9.31 3.40 - 10.80 10*3/mm3    RBC 5.21 3.77 - 5.28 10*6/mm3    Hemoglobin 15.4 12.0 - 15.9 g/dL    Hematocrit 46.1  34.0 - 46.6 %    MCV 88.5 79.0 - 97.0 fL    MCH 29.6 26.6 - 33.0 pg    MCHC 33.4 31.5 - 35.7 g/dL    RDW 12.7 12.3 - 15.4 %    Platelets 287 140 - 450 10*3/mm3   TSH Rfx On Abnormal To Free T4    Collection Time: 04/21/25 10:22 AM    Specimen: Blood   Result Value Ref Range    TSH 3.000 0.270 - 4.200 uIU/mL   Microalbumin / Creatinine Urine Ratio - Urine, Clean Catch    Collection Time: 04/21/25 10:22 AM    Specimen: Urine, Clean Catch   Result Value Ref Range    Creatinine, Urine 17.7 Not Estab. mg/dL    Microalbumin, Urine <3.0 Not Estab. ug/mL    Microalbumin/Creatinine Ratio <17 0 - 29 mg/g creat   Hepatitis C Antibody    Collection Time: 04/21/25 10:22 AM    Specimen: Blood   Result Value Ref Range    Hep C Virus Ab Non Reactive Non Reactive       Assessment & Plan  1. Hypertension.  Chronic, uncontrolled.  Her blood pressure readings have shown improvement but remain above the target of less than 130/80.  Review of home blood pressure log indicates the need for additional medication to achieve target blood pressure.  Discussion included the benefits of adding losartan to her current regimen for better blood pressure control and renal protection.  A 30-day supply of losartan 25 mg daily will be provided. Her metoprolol prescription will be refilled for a 90-day supply. Medication risks vs. Benefits and adverse effects discussed.    2. Diabetes mellitus.  New undiagnosed problem with uncertain prognosis.  Her A1c level is 6.6, indicating diabetes.  Lab results confirm the diagnosis and necessitate immediate management.  Discussion included the importance of lifestyle modifications, such as diet and exercise, and the need for a dilated eye exam due to potential blood vessel changes from chronic hypertension and diabetes.  A 30-day supply of metformin extended-release will be provided. She is encouraged to continue lifestyle modifications to help manage her condition.  Will do a foot check at her next  appointment.    3. Hyperlipidemia.  New undiagnosed problem with uncertain prognosis.  Her total cholesterol level is 196, with an LDL level of 126. The goal LDL level is 70.  Lab results indicate the need for medication to achieve target cholesterol levels.  Discussion included the benefits of adding Crestor to her regimen and the potential side effects, such as muscle cramps, which can be managed with coenzyme Q10 supplements.  A 30-day supply of Crestor 5 mg will be provided.    4. Stage 2 CKD  Her kidney function is mildly impaired with a level of 80.3 (normal is 90).  Lab results confirm the need for medication to preserve kidney function and better control her blood pressure and diabetes.  Discussion included the benefits of losartan for kidney protection and the importance of controlling blood pressure and blood sugar to preserve kidney function.  The addition of losartan will help protect her kidneys.    5. Postmenopausal bone loss.  Her alkaline phosphatase level is mildly elevated, likely due to postmenopausal bone loss.  Lab results indicate the need for supplementation to prevent further bone loss.  Discussion included the benefits of vitamin D supplementation and the need for a DEXA scan to assess bone density.  She is advised to supplement with vitamin D 1000 units daily.  She declines a DEXA scan at this visit, but we will rate visit this discussion at future follow-up.    Follow-up  The patient will follow up in 1 month.    Assessment & Plan       1. Primary hypertension  - losartan (Cozaar) 25 MG tablet; Take 1 tablet by mouth Daily. Indications: High Blood Pressure  Dispense: 30 tablet; Refill: 5  - metoprolol tartrate (LOPRESSOR) 25 MG tablet; Take 1 tablet by mouth 2 (Two) Times a Day.  Dispense: 180 tablet; Refill: 1    2. Anxiety  - metoprolol tartrate (LOPRESSOR) 25 MG tablet; Take 1 tablet by mouth 2 (Two) Times a Day.  Dispense: 180 tablet; Refill: 1    3. Uncontrolled type 2 diabetes  mellitus with hyperglycemia, without long-term current use of insulin  - metFORMIN ER (GLUCOPHAGE-XR) 500 MG 24 hr tablet; Take 1 tablet by mouth Daily With Breakfast. Indications: Type 2 Diabetes  Dispense: 30 tablet; Refill: 5  - rosuvastatin (Crestor) 5 MG tablet; Take 1 tablet by mouth Daily.  Dispense: 30 tablet; Refill: 5    4. Hypercholesterolemia  - rosuvastatin (Crestor) 5 MG tablet; Take 1 tablet by mouth Daily.  Dispense: 30 tablet; Refill: 5    5. CKD (chronic kidney disease) stage 2, GFR 60-89 ml/min         Return in about 1 month (around 6/5/2025) for follow-up htn, dm.       NITHIN Cervantes           Note to patient: The 21st Century Cures Act makes medical notes like these available to patients in the interest of transparency. However, be advised this is a medical document. It is intended as peer to peer communication. It is written in medical language and may contain abbreviations or verbiage that are unfamiliar. It may appear blunt or direct. Medical documents are intended to carry relevant information, facts as evident, and the clinical opinion of the practitioner.

## 2025-05-22 ENCOUNTER — OFFICE VISIT (OUTPATIENT)
Dept: OBSTETRICS AND GYNECOLOGY | Facility: CLINIC | Age: 60
End: 2025-05-22
Payer: COMMERCIAL

## 2025-05-22 VITALS
BODY MASS INDEX: 29.63 KG/M2 | DIASTOLIC BLOOD PRESSURE: 102 MMHG | SYSTOLIC BLOOD PRESSURE: 162 MMHG | HEIGHT: 62 IN | WEIGHT: 161 LBS

## 2025-05-22 DIAGNOSIS — Z11.51 SPECIAL SCREENING EXAMINATION FOR HUMAN PAPILLOMAVIRUS (HPV): ICD-10-CM

## 2025-05-22 DIAGNOSIS — E11.69 TYPE 2 DIABETES MELLITUS WITH OTHER SPECIFIED COMPLICATION, WITHOUT LONG-TERM CURRENT USE OF INSULIN: ICD-10-CM

## 2025-05-22 DIAGNOSIS — R31.21 ASYMPTOMATIC MICROSCOPIC HEMATURIA: ICD-10-CM

## 2025-05-22 DIAGNOSIS — Z01.419 CERVICAL SMEAR, AS PART OF ROUTINE GYNECOLOGICAL EXAMINATION: Primary | ICD-10-CM

## 2025-05-22 DIAGNOSIS — Z01.419 ROUTINE GYNECOLOGICAL EXAMINATION: ICD-10-CM

## 2025-05-22 RX ORDER — TRIAMCINOLONE ACETONIDE 5 MG/G
OINTMENT TOPICAL 2 TIMES DAILY
Qty: 15 G | Refills: 1 | Status: SHIPPED | OUTPATIENT
Start: 2025-05-22

## 2025-05-22 NOTE — PROGRESS NOTES
GYN Annual Exam     Chief Complaint   Patient presents with    Gynecologic Exam       Tori Maya is a 57 y.o. female who presents for annual well woman exam.She is an established patient. Her periods are absent since 2016. She denies menopausal symptoms.  No intermenstrual bleeding, spotting, or discharge.     She has a history of lichen sclerosis, she uses triamcinolone as needed, reports it is well controlled. She is not using estrogen cream. She uses coconut oil.     She does her SBE regularly. She is sexually active with her  but not often.     Changes in medical history have been disc and updated. She is overly anxious today. She recently lost her brother in law to cancer. She had to put her dog to sleep. She does not like coming to the doctor.     OB History          0    Para   0    Term   0       0    AB   0    Living   0         SAB   0    IAB   0    Ectopic   0    Molar        Multiple   0    Live Births                    Mammogram:3/24  Dexa scan: Has not had  Colonoscopy: Has not had  Last Pap :  NIL/HPV neg   History of abnormal Pap smear: no  Family history of uterine, colon or ovarian cancer: no  Family history of breast cancer: no  History of abnormal mammogram: no        Past Medical History:   Diagnosis Date    Allergies     Anxiety     Colitis     childhood    Epilepsy     age 1 none since     Hypertension     2013    Lichen sclerosus 2014    Lichen sclerosus     Skin disease     gaanular skin ds over joints        Past Surgical History:   Procedure Laterality Date    ANKLE SURGERY  2009    fracture    TUBAL ABDOMINAL LIGATION           Current Outpatient Medications:     fluticasone (FLONASE) 50 MCG/ACT nasal spray, , Disp: , Rfl:     hydrocortisone (Anucort-HC) 25 MG suppository, Insert 1 suppository into the rectum two times a day, Disp: 20 suppository, Rfl: 2    losartan (Cozaar) 25 MG tablet, Take 1 tablet by mouth Daily. Indications: High Blood Pressure, Disp:  "30 tablet, Rfl: 5    metFORMIN ER (GLUCOPHAGE-XR) 500 MG 24 hr tablet, Take 1 tablet by mouth Daily With Breakfast. Indications: Type 2 Diabetes, Disp: 30 tablet, Rfl: 5    metoprolol tartrate (LOPRESSOR) 25 MG tablet, Take 1 tablet by mouth 2 (Two) Times a Day., Disp: 180 tablet, Rfl: 1    rosuvastatin (Crestor) 5 MG tablet, Take 1 tablet by mouth Daily., Disp: 30 tablet, Rfl: 5    triamcinolone (KENALOG) 0.5 % ointment, Apply  topically to the appropriate area as directed 2 (Two) Times a Day., Disp: 15 g, Rfl: 1    Allergies   Allergen Reactions    Penicillins Other (See Comments)     Childhood rx    Latex Rash       Social History     Tobacco Use    Smoking status: Never    Smokeless tobacco: Never   Vaping Use    Vaping status: Never Used   Substance Use Topics    Alcohol use: Not Currently     Comment: 0-1 per week, unless special occasion    Drug use: Yes     Types: Marijuana     Comment: gummy at bedtime       Family History   Problem Relation Age of Onset    Crohn's disease Mother     Heart attack Father     Hypertension Father     Leukemia Other     Breast cancer Neg Hx     Ovarian cancer Neg Hx     Uterine cancer Neg Hx     Colon cancer Neg Hx        Review of Systems   Constitutional: Negative.    Respiratory: Negative.     Cardiovascular: Negative.    Gastrointestinal: Negative.    Endocrine: Negative.    Genitourinary: Negative.    Musculoskeletal: Negative.    Skin: Negative.    Neurological: Negative.    Psychiatric/Behavioral: Negative.         BP (!) 162/102   Ht 157.5 cm (62\")   Wt 73 kg (161 lb)   Breastfeeding No   BMI 29.45 kg/m²     Physical Exam  Vitals reviewed.   Constitutional:       Appearance: She is well-developed.   Neck:      Thyroid: No thyromegaly.   Cardiovascular:      Rate and Rhythm: Normal rate and regular rhythm.      Heart sounds: Normal heart sounds.   Pulmonary:      Effort: Pulmonary effort is normal.      Breath sounds: Normal breath sounds.   Chest:   Breasts:     " Right: No inverted nipple, mass, nipple discharge, skin change or tenderness.      Left: No inverted nipple, mass, nipple discharge, skin change or tenderness.   Abdominal:      General: Bowel sounds are normal.      Palpations: Abdomen is soft.   Genitourinary:     Exam position: Supine.      Labia:         Right: No rash, tenderness, lesion or injury.         Left: No rash, tenderness, lesion or injury.       Vagina: No signs of injury and foreign body. Erythema present. No vaginal discharge, tenderness or bleeding.      Cervix: No cervical motion tenderness, discharge or friability.      Uterus: Not deviated, not enlarged, not fixed and not tender.       Adnexa:         Right: No mass, tenderness or fullness.          Left: No mass, tenderness or fullness.        Rectum: Normal.      Comments: Atrophy noted on exam. Peds speculum exam used. Cervix very difficult to visualize.   Musculoskeletal:         General: Normal range of motion.      Cervical back: Normal range of motion and neck supple.   Skin:     General: Skin is warm and dry.   Neurological:      General: No focal deficit present.      Mental Status: She is alert and oriented to person, place, and time.   Psychiatric:         Mood and Affect: Mood normal.         Behavior: Behavior normal.            Assessment     1) GYN annual well woman exam.   2) Postmenopausal   3) Lichen sclerosis   4) Vaginal atrophy   5) Hematuria      Plan     1) Breast Health - Clinical breast exam & mammogram yearly, Self breast awareness. Schedule screening mammogram.   2) Pap - Collected today.   3) Lichen sclerosis- Using steroid cream. Refills prescribed. Enc vaginal estrogen use.   4) Vaginal atrophy- Declines estrogen cream. Continue coconut oil.   5) STD- Declines  6) Smoking status- non smoker   7) Colon health - screening colonoscopy recommended if not up to date. Declines ref or cologuard.  8) Bone health - Weight bearing exercise, dietary calcium recommendations  and vitamin D  reviewed.   9) Hematuria: Check urine cx. This has now been on her UA for the last 3 samples. Ref to urology.   10) Elevated BP- Pt on BP meds and checking BP at home. Reports this morning it was 117/70.  Enc pt to keep a BP log and present to PCP with log since she has elevated BP in the office with each visit.   11) Anxiety- PCP is using metoprolol for BP and anxiety. Pt does have a counselor.   12) Type 2 DM- On metformin. Pt is not happy about taking medication. She prefers lifestyle modifications. Ref to nutrition for dietary education.     Follow up prn and one year    NITHIN Cox  5/22/2025  10:06 EDT

## 2025-05-27 LAB
CYTOLOGIST CVX/VAG CYTO: NORMAL
CYTOLOGY CVX/VAG DOC CYTO: NORMAL
CYTOLOGY CVX/VAG DOC THIN PREP: NORMAL
DX ICD CODE: NORMAL
HPV I/H RISK 4 DNA CVX QL PROBE+SIG AMP: NEGATIVE
OTHER STN SPEC: NORMAL
SERVICE CMNT-IMP: NORMAL
STAT OF ADQ CVX/VAG CYTO-IMP: NORMAL

## 2025-06-04 ENCOUNTER — OFFICE VISIT (OUTPATIENT)
Dept: FAMILY MEDICINE CLINIC | Facility: CLINIC | Age: 60
End: 2025-06-04
Payer: COMMERCIAL

## 2025-06-04 VITALS
SYSTOLIC BLOOD PRESSURE: 130 MMHG | TEMPERATURE: 97.5 F | DIASTOLIC BLOOD PRESSURE: 90 MMHG | HEART RATE: 85 BPM | OXYGEN SATURATION: 98 % | HEIGHT: 62 IN | WEIGHT: 156.4 LBS | BODY MASS INDEX: 28.78 KG/M2

## 2025-06-04 DIAGNOSIS — F32.A ANXIETY AND DEPRESSION: ICD-10-CM

## 2025-06-04 DIAGNOSIS — E78.00 HYPERCHOLESTEROLEMIA: ICD-10-CM

## 2025-06-04 DIAGNOSIS — Z79.899 ON STATIN THERAPY: ICD-10-CM

## 2025-06-04 DIAGNOSIS — I10 PRIMARY HYPERTENSION: Primary | ICD-10-CM

## 2025-06-04 DIAGNOSIS — F41.9 ANXIETY AND DEPRESSION: ICD-10-CM

## 2025-06-04 DIAGNOSIS — N18.2 CKD (CHRONIC KIDNEY DISEASE) STAGE 2, GFR 60-89 ML/MIN: ICD-10-CM

## 2025-06-04 DIAGNOSIS — E11.65 UNCONTROLLED TYPE 2 DIABETES MELLITUS WITH HYPERGLYCEMIA, WITHOUT LONG-TERM CURRENT USE OF INSULIN: ICD-10-CM

## 2025-06-04 DIAGNOSIS — E66.3 OVERWEIGHT (BMI 25.0-29.9): ICD-10-CM

## 2025-06-04 RX ORDER — CHLORAL HYDRATE 500 MG
CAPSULE ORAL
COMMUNITY

## 2025-06-04 NOTE — PROGRESS NOTES
Patient or patient representative verbalized consent for the use of Ambient Listening during the visit with  NITHIN Cervantes for chart documentation. 6/4/2025  09:06 EDT    Chief Complaint   Patient presents with    Diabetes    Hypertension       Subjective      Patient ID: Tori is a 60 y.o. female.     Chief Complaint   Patient presents with    Diabetes    Hypertension        Diabetes  Hypertension       History of Present Illness  The patient presents for evaluation of hematuria, hypertension, diabetes, and anxiety/depression.    Anxiety/depression  - she reports experiencing stress and depression due to recent life events, including the euthanasia of her dog and the death of her brother-in-law. She has a history of childhood trauma and is currently seeing a therapist.  She is on metoprolol both for blood pressure control and anxiety and reports this medication has been helping.  She does not wish to start a new medication for anxiety and depression at this time.    Hematuria - She has been referred to urology by her OB/GYN, Roma Sosa, but has not yet received a call from the urologist. She has been diagnosed with vaginal atrophy and lichen sclerosus, which have caused skin irritation and may be the source of her microscopic hematuria. She has also noticed occasional cloudy urine after consuming bananas and other foods higher in sugar.    Hypertension  -losartan 25 mg daily was recently added to her regimen of metoprolol 25 mg twice daily.  She has been monitoring her blood pressure at home, which initially presents as high but normalizes upon rechecking. Her most recent readings were 126/77 and 118/77. She has been practicing yoga and mindfulness techniques to manage her blood pressure.     DM 2/HLD  - she has lost 8 pounds and aims to lose 10 percent of her body weight. She has been mindful of her salt intake and has been using Mrs. Mckeon as a substitute. Her diet includes green peppers, red  "peppers, apples, strawberries, yogurt, cottage cheese, olive oil, vinegar, herbs, butter, small amounts of bread, beans, lopez beans with dried jalapenos, paprika, cream cheese, peanut butter, almonds, and Metamucil once daily. She has purchased fish oil and cinnamon supplements and takes adult daily vitamins. She is currently on Crestor 5 mg and metformin.  She denies adverse effects from these medications.       The following portions of the patient's history were reviewed and updated as appropriate: allergies, current medications, past family history, past medical history, past social history, past surgical history, and problem list.    Results          Objective      /90   Pulse 85   Temp 97.5 °F (36.4 °C) (Infrared)   Ht 157.5 cm (62\")   Wt 70.9 kg (156 lb 6.4 oz)   SpO2 98%   BMI 28.61 kg/m²      Body mass index is 28.61 kg/m².           Physical Exam  Vitals reviewed.   Constitutional:       General: She is not in acute distress.     Appearance: Normal appearance. She is overweight.   Eyes:      Pupils: Pupils are equal, round, and reactive to light.   Neck:      Vascular: No carotid bruit or JVD.   Cardiovascular:      Rate and Rhythm: Normal rate and regular rhythm.      Pulses: Normal pulses.           Radial pulses are 2+ on the right side and 2+ on the left side.        Posterior tibial pulses are 2+ on the right side and 2+ on the left side.      Heart sounds: Normal heart sounds. No murmur heard.     No friction rub.   Pulmonary:      Effort: Pulmonary effort is normal. No respiratory distress.      Breath sounds: Normal breath sounds. No wheezing or rales.   Musculoskeletal:      Cervical back: Neck supple.      Right lower leg: No edema.      Left lower leg: No edema.   Neurological:      General: No focal deficit present.      Mental Status: She is alert.   Psychiatric:         Mood and Affect: Mood normal.         Behavior: Behavior normal.          Physical Exam          Assessment & " Plan  1. Hematuria.  - The presence of blood in the urine could potentially be attributed to vaginal atrophy and lichen sclerosus.  - Referral to urology has been initiated by Gyn for further evaluation.  - Awaiting further assessment from urology.    2. Hypertension.  - Blood pressure readings have shown significant improvement, with today's reading at 130/90, but normal readings at home.  - Currently on metoprolol and losartan 25 mg.  - Advised to continue monitoring blood pressure at home and report any readings below 90/60 or persistent readings >140/90.  - If blood pressure consistently remains below 120/80 with lifestyle modifications, a reduction in antihypertensive medication may be considered.    3. Diabetes Mellitus.  - Currently on metformin and adhering to medication regimen.  - Advised to continue current medications and dietary modifications.  - Referral to nutrition services has been made to assist with dietary management for diabetes.  - Laboratory tests will be conducted today to monitor cholesterol levels, CK, CMP, blood sugar, kidney function, liver function, and electrolytes.    4. Anxiety and Depression.  - Reports feeling depressed due to recent stressful events, including the loss of her dog and brother-in-law.  - Potential benefits of metoprolol for anxiety were discussed.  She declines additional medications for mental health today.  - Encouraged to continue with current mental health support and consider additional therapy if needed.  - Discussed the importance of mental health and coping strategies.    5. Weight Management.  - Lost 8 pounds, reducing weight from 164 to 156 pounds.  - BMI is now 28.  - Advised to continue current weight loss efforts through dietary modifications and exercise.  - Emphasized the importance of maintaining a healthy weight to manage diabetes and overall health.    Follow-up  - Follow-up in 3 months.    Assessment & Plan       Diagnoses and all orders for this  visit:    1. Primary hypertension (Primary)  -     Comprehensive Metabolic Panel  -     Lipid Panel  -     CK    2. Hypercholesterolemia  -     Comprehensive Metabolic Panel  -     Lipid Panel  -     CK    3. On statin therapy  -     Comprehensive Metabolic Panel  -     Lipid Panel  -     CK    4. Uncontrolled type 2 diabetes mellitus with hyperglycemia, without long-term current use of insulin    5. CKD (chronic kidney disease) stage 2, GFR 60-89 ml/min    6. Overweight (BMI 25.0-29.9)    7. Anxiety and depression                Return in about 3 months (around 9/4/2025) for f/u DM2 HTN.       NITHIN Cervantes           Note to patient: The 21st Century Cures Act makes medical notes like these available to patients in the interest of transparency. However, be advised this is a medical document. It is intended as peer to peer communication. It is written in medical language and may contain abbreviations or verbiage that are unfamiliar. It may appear blunt or direct. Medical documents are intended to carry relevant information, facts as evident, and the clinical opinion of the practitioner.

## 2025-06-05 LAB
ALBUMIN SERPL-MCNC: 4.7 G/DL (ref 3.9–4.9)
ALP SERPL-CCNC: 112 IU/L (ref 44–121)
ALT SERPL-CCNC: 20 IU/L (ref 0–32)
AST SERPL-CCNC: 19 IU/L (ref 0–40)
BILIRUB SERPL-MCNC: 0.5 MG/DL (ref 0–1.2)
BUN SERPL-MCNC: 15 MG/DL (ref 8–27)
BUN/CREAT SERPL: 17 (ref 12–28)
CALCIUM SERPL-MCNC: 10 MG/DL (ref 8.7–10.3)
CHLORIDE SERPL-SCNC: 99 MMOL/L (ref 96–106)
CHOLEST SERPL-MCNC: 118 MG/DL (ref 100–199)
CK SERPL-CCNC: 82 U/L (ref 32–182)
CO2 SERPL-SCNC: 22 MMOL/L (ref 20–29)
CREAT SERPL-MCNC: 0.88 MG/DL (ref 0.57–1)
EGFRCR SERPLBLD CKD-EPI 2021: 75 ML/MIN/1.73
GLOBULIN SER CALC-MCNC: 2.4 G/DL (ref 1.5–4.5)
GLUCOSE SERPL-MCNC: 120 MG/DL (ref 70–99)
HDLC SERPL-MCNC: 47 MG/DL
LDLC SERPL CALC-MCNC: 55 MG/DL (ref 0–99)
POTASSIUM SERPL-SCNC: 4.9 MMOL/L (ref 3.5–5.2)
PROT SERPL-MCNC: 7.1 G/DL (ref 6–8.5)
SODIUM SERPL-SCNC: 138 MMOL/L (ref 134–144)
TRIGL SERPL-MCNC: 78 MG/DL (ref 0–149)
VLDLC SERPL CALC-MCNC: 16 MG/DL (ref 5–40)

## 2025-08-09 DIAGNOSIS — F41.9 ANXIETY: ICD-10-CM

## 2025-08-09 DIAGNOSIS — I10 PRIMARY HYPERTENSION: ICD-10-CM

## 2025-08-11 RX ORDER — METOPROLOL TARTRATE 25 MG/1
25 TABLET, FILM COATED ORAL 2 TIMES DAILY
Qty: 90 TABLET | OUTPATIENT
Start: 2025-08-11

## 2025-08-13 ENCOUNTER — OFFICE VISIT (OUTPATIENT)
Dept: FAMILY MEDICINE CLINIC | Facility: CLINIC | Age: 60
End: 2025-08-13
Payer: COMMERCIAL

## 2025-08-13 VITALS
BODY MASS INDEX: 28.16 KG/M2 | HEIGHT: 62 IN | HEART RATE: 94 BPM | SYSTOLIC BLOOD PRESSURE: 126 MMHG | OXYGEN SATURATION: 99 % | DIASTOLIC BLOOD PRESSURE: 76 MMHG | TEMPERATURE: 97.5 F | WEIGHT: 153 LBS

## 2025-08-13 DIAGNOSIS — H66.001 NON-RECURRENT ACUTE SUPPURATIVE OTITIS MEDIA OF RIGHT EAR WITHOUT SPONTANEOUS RUPTURE OF TYMPANIC MEMBRANE: Primary | ICD-10-CM

## 2025-08-13 PROCEDURE — 99213 OFFICE O/P EST LOW 20 MIN: CPT | Performed by: FAMILY MEDICINE

## 2025-08-13 RX ORDER — CEFDINIR 300 MG/1
300 CAPSULE ORAL 2 TIMES DAILY
Qty: 20 CAPSULE | Refills: 0 | Status: SHIPPED | OUTPATIENT
Start: 2025-08-13

## 2025-08-15 ENCOUNTER — TRANSCRIBE ORDERS (OUTPATIENT)
Dept: ADMINISTRATIVE | Facility: HOSPITAL | Age: 60
End: 2025-08-15
Payer: COMMERCIAL

## 2025-08-15 DIAGNOSIS — R31.29 OTHER MICROSCOPIC HEMATURIA: Primary | ICD-10-CM
